# Patient Record
(demographics unavailable — no encounter records)

---

## 2019-04-12 NOTE — MORECARE
CASE MANAGEMENT DISCHARGE SUMMARY
 
 
PATIENT: ROCIO BLAIR                       UNIT: [id number]
ACCOUNT#: [id number]                       ADM DATE: 19
AGE: 72     : 02/15/47  SEX: M            ROOM/BED: D.2309    
AUTHOR: DARIANA PAUL                             PHYSICIAN:                               
 
REFERRING PHYSICIAN: BARBARA RIDLEY MD               
DATE OF SERVICE: 19
Discharge Plan
 
 
Patient Name: ROCIO BLAIR
[facility]
Encounter #: [id number]
Medical Record #: [id number]
: 1947
Planned Disposition: Home with Home Health and Infusion Serv
Anticipated Discharge Date: 
 
Discharge Date: 
Expected LOS: 
Initial Reviewer: [email or web address]
Initial Review Date: 2019
Generated: 19   7:01 pm 
  
 
 
 
 
 
 
 
Patient Name: ROCIO BLAIR
 
Encounter #: [id number]
Page [id number]
 
 
 
 
 
Electronically Signed by DARIANA PAUL on 19 at 1801
 
 
 
 
 
 
**All edits/amendments must be made on the electronic document**
 
DICTATION DATE: 19     : FRANK  19     
RPT#: [id number]                                DC DATE:        
                                               STATUS: ADM IN  
[facility]
[address] [place] [address]
***END OF REPORT***

## 2019-04-12 NOTE — MORECARE
CASE MANAGEMENT DISCHARGE SUMMARY
 
 
PATIENT: ROCIO BLAIR                       UNIT: [id number]
ACCOUNT#: [id number]                       ADM DATE: 19
AGE: 72     : 02/15/47  SEX: M            ROOM/BED: D.2309    
AUTHOR: HUMBERTO,DOC                             PHYSICIAN:                               
 
REFERRING PHYSICIAN: BARBARA RIDLEY MD               
DATE OF SERVICE: 19
Discharge Plan
 
 
Patient Name: ROCIO BLAIR
Facility: [facility]:[place]
Encounter #: [id number]
Medical Record #: [id number]
: 1947
Planned Disposition: Home with Home Health and Infusion Serv
Anticipated Discharge Date: 
 
Discharge Date: 
Expected LOS: 
Initial Reviewer: [email or web address]
Initial Review Date: 2019
Generated: 19   7:23 pm 
Comments
 
DCP- Discharge Planning
 
Updated by [email or web address]: Ashley Adams on 19   5:22 pm CT
LATE ENTRY  19 @ 1300  
  
Patient Name: ROCIO BLAIR                                     
Admission Status: ER   
Accout number: [id number]                              
Admission Date: 2019   
: 1947                                                        
Admission Diagnosis:MALIGNANT NEOPLASM OF GLOTTIS   
Attending: BARBARA RIDLEY                                                
Current LOS:  4   
  
Anticipated DC Date:    
Planned Disposition: Home with Home Health and Infusion Serv   
Primary Insurance: [employer] CHOICE PPO [facility]   
  
  
Discharge Planning Comments: CM met with patient at bedside he was able to 
mouth and nod his head to answer questions.  He states he plans on living 
with his sister upon discharge. Has no medical equipment or previous home 
 
health services.  Patient gave CM permission to call sister Madhuri for any 
information needed.  CM will continue to follow and assist as needed with 
discharge planning / needs.  
  
  
  
  
  
  
: Ashley Adams
 DCPIA - Discharge Planning Initial Assessment
 
Updated by [email or web address]: Ashley Adams on 19   6:05 pm
*  Is the patient Alert and Oriented?
Yes
*  PCP
NO PCP
*  Pharmacy
DENIES HAVING A PHARMACY
*  Preadmission Environment
Home Alone
*  ADLs
Independent
*  Equipment
None
*  List name and contact numbers for known caregivers / representatives who 
currently or will assist patient after discharge:
MADHURI AVITIA  - [phone]
*  Verbal permission to speak to the caregivers and representatives has been 
obtained from the patient.
Yes
*  Community resources currently utilized
None
*  Additional services required to return to the preadmission environment?
No
*  Can the patient safely return to the preadmission environment?
Yes
*  Has this patient been hospitalized within the prior 30 days at any 
hospital?
No
 
 
 
 
 
 
 
Last DP export: 19   5:08 p
Patient Name: ROCIO BLAIR
 
Encounter #: [id number]
Page [id number]
 
 
 
 
 
Electronically Signed by DARIANA PAUL on 19 at 1823
 
 
 
 
 
 
**All edits/amendments must be made on the electronic document**
 
DICTATION DATE: 19     : FRANK  19     
RPT#: [id number]                                DC DATE:        
                                               STATUS: ADM IN  
[facility]
[address] [place] [address]
***END OF REPORT***

## 2019-04-12 NOTE — MORECARE
CASE MANAGEMENT DISCHARGE SUMMARY
 
 
PATIENT: ROCIO BLAIR                       UNIT: [id number]
ACCOUNT#: [id number]                       ADM DATE: 19
AGE: 72     : 02/15/47  SEX: M            ROOM/BED: D.2309    
AUTHOR: DARIANA PAUL                             PHYSICIAN:                               
 
REFERRING PHYSICIAN: BARBARA RIDLEY MD               
DATE OF SERVICE: 19
Discharge Plan
 
 
Patient Name: ROCIO BLAIR
[facility]
Encounter #: [id number]
Medical Record #: [id number]
: 1947
Planned Disposition: Home with Home Health and Infusion Serv
Anticipated Discharge Date: 
 
Discharge Date: 
Expected LOS: 
Initial Reviewer: [id number]
Initial Review Date: 2019
Generated: 19   7:08 pm 
 DCPIA - Discharge Planning Initial Assessment
 
Updated by [email or web address]: Ashley Adams on 19   6:05 pm
*  Is the patient Alert and Oriented?
Yes
*  PCP
NO PCP
*  Pharmacy
DENIES HAVING A PHARMACY
*  Preadmission Environment
Home Alone
*  ADLs
Independent
*  Equipment
None
*  List name and contact numbers for known caregivers / representatives who 
currently or will assist patient after discharge:
LUCIE AVITIA [facility] [phone]
*  Verbal permission to speak to the caregivers and representatives has been 
obtained from the patient.
Yes
*  Community resources currently utilized
None
*  Additional services required to return to the preadmission environment?
 
No
*  Can the patient safely return to the preadmission environment?
Yes
*  Has this patient been hospitalized within the prior 30 days at any 
hospital?
No
 
 
 
 
 
 
 
Last DP export: 19   5:01 p
Patient Name: ROCIO BLAIR
Encounter #: [id number]
Page [id number]
 
 
 
 
 
Electronically Signed by DARIANA PAUL on 19 at 1809
 
 
 
 
 
 
**All edits/amendments must be made on the electronic document**
 
DICTATION DATE: 19     : FRANK  19     
RPT#: [id number]                                DC DATE:        
                                               STATUS: ADM IN  
[facility]
 [place] [address]
***END OF REPORT***

## 2019-04-12 NOTE — MORECARE
CASE MANAGEMENT DISCHARGE SUMMARY
 
 
PATIENT: ROCIO BLAIR                       UNIT: [id number]
ACCOUNT#: [id number]                       ADM DATE: 19
AGE: 72     : 02/15/47  SEX: M            ROOM/BED: D.2309    
AUTHOR: HUMBERTO,DOC                             PHYSICIAN:                               
 
REFERRING PHYSICIAN: BARBARA RIDLEY MD               
DATE OF SERVICE: 19
Discharge Plan
 
 
Patient Name: ROCIO BLAIR
Facility: [facility]:[place]
Encounter #: [id number]
Medical Record #: [id number]
: 1947
Planned Disposition: Home with Home Health and Infusion Serv
Anticipated Discharge Date: 
 
Discharge Date: 
Expected LOS: 
Initial Reviewer: [id number]
Initial Review Date: 2019
Generated: 19   7:35 pm 
Comments
 
DCP- Discharge Planning
 
Updated by [id number]: Ashley Adams on 19   5:34 pm CT
CM received call from Dr. Waite that patient is going to need laryngectomy. 
He states that the patient and sister will need teaching on trach care and 
tube feedings prior to discharge.  He states that he will most likely need 
home health.  He states that he wants when the patient is discharged to go 
see [place] in [place] for [place]. within the same week.  Planning on discharge 
early next week.  CM wasn't able to get in touch with patients sister for [facility] 
for Home Health services.  CM will continue to follow and assist as needed 
with discharge planning / needs.
DCP- Discharge Planning
 
Updated by [id number]: Ashley Adams on 19   5:22 pm CT
LATE ENTRY  19 @ 1300  
  
Patient Name: ROCIO BLAIR                                     
Admission Status: ER   
Accout number: [id number]                              
Admission Date: 2019   
: 1947                                                        
Admission Diagnosis:MALIGNANT NEOPLASM OF GLOTTIS   
 
Attending: BARBARA RIDLEY                                                
Current LOS:  4   
  
Anticipated DC Date:    
Planned Disposition: Home with Home Health and Infusion Serv   
Primary Insurance: [employer] CHOICE PPO [facility]   
  
  
Discharge Planning Comments: CM met with patient at bedside he was able to 
mouth and nod his head to answer questions.  He states he plans on living 
with his sister upon discharge. Has no medical equipment or previous home 
health services.  Patient gave CM permission to call sister Madhuri for any 
information needed.  CM will continue to follow and assist as needed with 
discharge planning / needs.  
  
  
  
  
  
  
: Ashley Adams
 DCPIA - Discharge Planning Initial Assessment
 
Updated by [id number]: Ashley Adams on 19   6:05 pm
*  Is the patient Alert and Oriented?
Yes
*  PCP
NO PCP
*  Pharmacy
DENIES HAVING A PHARMACY
*  Preadmission Environment
Home Alone
*  ADLs
Independent
*  Equipment
None
*  List name and contact numbers for known caregivers / representatives who 
currently or will assist patient after discharge:
MADHURI AVITIA - [facility] [phone]
*  Verbal permission to speak to the caregivers and representatives has been 
obtained from the patient.
Yes
*  Community resources currently utilized
None
*  Additional services required to return to the preadmission environment?
No
*  Can the patient safely return to the preadmission environment?
Yes
*  Has this patient been hospitalized within the prior 30 days at any 
hospital?
No
 
 
 
 
 
 
 
 
Last DP export: 19   5:23 p
Patient Name: ROCIO BLAIR
Encounter #: [id number]
Page [id number]
 
 
 
 
 
Electronically Signed by DARIANA PAUL on 19 at 1835
 
 
 
 
 
 
**All edits/amendments must be made on the electronic document**
 
DICTATION DATE: 19     : FRANK  19     
RPT#: [id number]                                DC DATE:        
                                               STATUS: ADM IN  
[facility]
[address] [place] [address]
***END OF REPORT***

## 2019-04-13 NOTE — MORECARE
CASE MANAGEMENT DISCHARGE SUMMARY
 
 
PATIENT: ROCIO BLAIR                       UNIT: [id number]
ACCOUNT#: [id number]                       ADM DATE: 19
AGE: 72     : 02/15/47  SEX: M            ROOM/BED: D.2216    
AUTHOR: HUMBERTO,DOC                             PHYSICIAN:                               
 
REFERRING PHYSICIAN: BARBARA RIDLEY MD               
DATE OF SERVICE: 19
Discharge Plan
 
 
Patient Name: ROCIO BLAIR
Facility: [facility]:[place]
Encounter #: [id number]
Medical Record #: [id number]
: 1947
Planned Disposition: Home with Home Health and Infusion Serv
Anticipated Discharge Date: 
 
Discharge Date: 
Expected LOS: 
Initial Reviewer: [email or web address]
Initial Review Date: 2019
Generated: 19   3:59 pm 
Comments
 
DCP- Discharge Planning
 
Updated by [id number]: Carly Newman on 19   1:58 pm CT
DR HERNANDEZ SPOKE W/ CM THIS EARLY AM.   
HE WILL BE REFERRING THE PATIENT TO DR JEROME [facility]ILLILAND [facility] LOC[facility] IN [place]. HE WILL PERSONALLY CALL DR ELLISON ON MONDAY TO 
EXPEDIATE THE REFERRAL.  
CM ADVISED THE PATIENT.  
CM CALLED [facility] AND REQUESTED THE  PATIENT'S FILMS ON DISC. 
RADIOLOGY WILL DELIVER THE DISC TO THE UNIT ON .  
PACKET W/ CLINICAL INFORMATION PREPARED.  
CM SPOKE W/ RESPIRATORY THERAPIST, THOMAS, REGARDING PATIENT TEACHING. HE BEGAN 
TEACHING W/ THE PATIENT THIS AM. HE WILL DOCUMENT SUBJECT AND PROGRESS.  
CAPRI SPOKE WITH THE PRIMARY NURSE, ELEANOR, TO UPDATE. DISCUSSED TEACHING NEEDS. 
SHE ALSO IS FAMILIAR W/ DR ELLISON AND WILL DISCUSS WITH THE PATIENT IS HE HAS 
ANY CONCERNS OR QUESTIONS.  
 STATED THE PATIENT MAYBE DISCHARGE TO HOME W/ ERLINDA.  
CM WILL FOLLOW UP WITH THE PATIENT AND HIS SISTER REGARDING HOME HEALTH 
SERVICES AND PROVIDERS.  
CM RECEIVED CM CONSULT. INITIAL ASSESSMENT WAS DONE 19.  
CM FOLLOWING FOR DCP NEEDS.
DCP- Discharge Planning
 
 
Updated by [id number]: Ashley Adams on 19   5:34 pm CT
CM received call from Dr. Hernandez that patient is going to need laryngectomy. 
He states that the patient and sister will need teaching on trach care and 
tube feedings prior to discharge.  He states that he will most likely need 
home health.  He states that he wants when the patient is discharged to go 
see [place] in [place] for [place]. within the same week.  Planning on discharge 
early next week.  CM wasn't able to get in touch with patients sister for [facility] 
for Home Health services.  CM will continue to follow and assist as needed 
with discharge planning / needs.
DCP- Discharge Planning
 
Updated by [id number]: Ashley Adams on 19   5:22 pm CT
LATE ENTRY  19 @ 1300  
  
Patient Name: ROCIO BLAIR                                     
Admission Status: ER   
Accout number: [id number]                              
Admission Date: 2019   
: 1947                                                        
Admission Diagnosis:MALIGNANT NEOPLASM OF GLOTTIS   
Attending: BARBARA RIDLEY                                                
Current LOS:  4   
  
Anticipated DC Date:    
Planned Disposition: Home with Home Health and Infusion Serv   
Primary Insurance: [employer] CHO[employer] PPO [facility]   
  
  
Discharge Planning Comments: CM met with patient at bedside he was able to 
mouth and nod his head to answer questions.  He states he plans on living 
with his sister upon discharge. Has no medical equipment or previous home 
health services.  Patient gave CM permission to call sister Madhuri for any 
information needed.  CM will continue to follow and assist as needed with 
discharge planning / needs.  
  
  
  
  
  
  
: Ashley Adams
 DCPIA - Discharge Planning Initial Assessment
 
Updated by [id number]: Ashley Adams on 19   6:05 pm
*  Is the patient Alert and Oriented?
Yes
*  PCP
NO PCP
*  Pharmacy
DENIES HAVING A PHARMACY
*  Preadmission Environment
 
Home Alone
*  ADLs
Independent
*  Equipment
None
*  List name and contact numbers for known caregivers / representatives who 
currently or will assist patient after discharge:
MADHURI AVITIA - - [phone]
*  Verbal permission to speak to the caregivers and representatives has been 
obtained from the patient.
Yes
*  Community resources currently utilized
None
*  Additional services required to return to the preadmission environment?
No
*  Can the patient safely return to the preadmission environment?
Yes
*  Has this patient been hospitalized within the prior 30 days at any 
hospital?
No
 
 
 
 
 
 
 
Last DP export: 19   5:35 p
Patient Name: ROCIO BLAIR
Encounter #: [id number]
Page [id number]
 
 
 
 
 
Electronically Signed by DARIANA PAUL on 19 at 5176
 
 
 
 
 
 
**All edits/amendments must be made on the electronic document**
 
DICTATION DATE: 19 7785     : FRANK  19 0374     
RPT#: [id number]                                DC DATE:        
                                               STATUS: ADM IN  
[facility]
 [place] [address]
***END OF REPORT***

## 2019-04-13 NOTE — CN
PATIENT NAME:ROCIO CHICAS                                 MEDICAL RECORD: [id number]
: 02/15/47                                              LOCATION:[facility]
ADMIT DATE: 19                                       ACCOUNT: [id number]
CONSULTING PHYSICIAN:    ADA HERNANDEZ MD                
                                               
REFERRING PHYSICIAN:     BARBARA RIDLEY MD               
 
 
DATE OF CONSULTATION:  2019
 
CONSULT FROM:  Emergency Room.
 
REASON FOR CONSULT:  For airway compromise.
 
HISTORY OF PRESENT ILLNESS:  Mr. Chicas is a 72-year-old male was sent from
[place] with hypertension and difficulty breathing.  Had a CT done at [place]
the time of the consult, all I knew was that there was a supraglottic mass, on
seeing the patient, he was obviously sitting upright, 100% nonrebreather, barely
maintaining sats.  He was able to communicate, not really able to understand his
voice very well, extreme hoarseness, biphasic stridor, labored breathing, talked
to him a little, was unable to get too much information but just that he had
progressive trouble with his breathing.  It was not really acute, talked to him
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
cancer and a long history of smoking.  We went over that with him, possibility
of awake tracheotomy versus intubating and then tracheotomy, was not sure at
that time.
 
PLAN:  OR emergently for tracheotomy, laryngoscopy, biopsy.
 
TRANSINT:[id number] Voice Confirmation ID: [id number] DOCUMENT ID: [id number]
                                           
                                           ADA HERNANDEZ MD                
 
 
 
Electronically Signed by ADA HERNANDEZ on 19 at 1049
 
 
 
 
 
 
 
 
 
 
 
 
 
 
CC:                                                             [id number]
DICTATION DATE: 19     :     19 2343      ADM IN  
                                                                              
[facility]                                          
[address] [place] [address]

## 2019-04-13 NOTE — OP
PATIENT NAME:  ROCIO BLAIR                             MEDICAL RECORD: [id number]
:02/15/47                                             LOCATION:[facility]
                                                         ADMISSION DATE:19
SURGEON:  GORDON HERNANDEZ MD                
 
 
DATE OF OPERATION:  2019
 
PREOPERATIVE DIAGNOSIS:  Stridor airway obstruction.
 
POSTOPERATIVE DIAGNOSIS:  Stridor airway obstruction.
 
PROCEDURE:  Emergent tracheotomy and laryngoscopy with biopsy.
 
SURGEON:  Gordon Hernandez MD
 
ANESTHESIA:  General orotracheal.
 
BLOOD LOSS:  1 cc.
 
SPECIMENS:  Multiple biopsies of the glottic area and really no cords.
 
TRACH TUBE:  A #6 cuffed Shiley.
 
COMPLICATIONS:  None.
 
DISPOSITION:  ICU.
 
FINDINGS:  Flexible laryngoscopy before he was given any sedation showed a
fairly normal supraglottic larynx.  The arytenoids looked normal.  AE folds,
piriforms looked normal.  Postcricoid area looked normal, but the area of the
cords was just a white gray tumor and there was a slit-like area where air was
moving through.  He was edentulous, had good neck mobility.  On exam of his
neck, there were no palpable masses.  His cricoid was right at the sternal
notch.  He was sedated with just propofol and with a #4 Gaona blade.  Easily
and quickly placed a 5-1/2 cuffed tube through the tumor mass on to the airway
and the cuff was inflated that was used to ventilate him and then he was prepped
and draped in usual sterile fashion for tracheotomy.  Skin was injected with 1
cc of 1% lidocaine with 1:100,000 epinephrine and a horizontal incision was made
residential just right at the level of the cricoid, really it was right above the
sternal notch.  This was taken down.  The cricoid was palpated.  The strap
muscles were divided in the midline.  Upper portion of the thyroid was divided
with a Spatula tip cautery.  The trachea was identified.  I tried to perform a
fairly high tracheotomy to preserve as much of the trachea for laryngectomy as
possible, but entered tumor just below the cricoid, so went a little bit further
down, second tracheal ring, took that out after decrease in the end tidal, the
FiO2, entered the trachea with a 15 blade, removed that portion of the anterior
tracheal ring with a tonsil clamp and then backed up the ET tube slightly and
placed a #6 cuffed Shiley, inflated the cuff, hooked him up to the ventilator,
couple of 2-0 Prolene sutures in the skin to make the tracheotomy wound smaller
and then sutured the trach tube to the skin with a flange and 0 Prolene and then
placed the trach ties tied around the neck, the cotton trach tie.  Then turned
the table 90 degrees.  Kleinsasser J laryngoscope was inserted, again viewed the
larynx.  Supraglottic larynx again fairly normal.  The entire glottic level of
the glottis was just a gray tumor mass.  No discernible cords or anatomy below
that down into the subglottic area, took about 10 biopsies to grade little bit
of an open airway  there as well with upbiting 4 mm cup forceps that was all
sent in formalin for path.  There was really no bleeding there.  He was
 
 
 
OPERATIVE REPORT                               [id number]    ROCIO BLAIR           
 
 
transported to the ICU on the ventilator.
 
TRANSINT:[id number] Voice Confirmation ID: [id number] DOCUMENT ID: [id number]
                                           
                                           GORDON HERNANDEZ MD                
 
 
 
Electronically Signed by GORDON HERNANDEZ on 19 at 1049
 
 
 
 
 
 
 
 
 
 
 
 
 
 
 
 
 
 
 
 
 
 
 
 
 
 
 
 
 
 
 
 
 
 
 
 
 
 
CC:                                                             [id number]
DICTATION DATE: 19     :     19 0297      ADM IN  
                                                                              
[facility]                                          
[address] [id number]

## 2019-04-15 NOTE — MORECARE
CASE MANAGEMENT DISCHARGE SUMMARY
 
 
PATIENT: ROCIO BLAIR                       UNIT: [id number]
ACCOUNT#: [id number]                       ADM DATE: 19
AGE: 72     : 02/15/47  SEX: M            ROOM/BED: D.2216    
AUTHOR: HUMBERTO,DOC                             PHYSICIAN:                               
 
REFERRING PHYSICIAN: BARBARA RIDLEY MD               
DATE OF SERVICE: 04/15/19
Discharge Plan
 
 
Patient Name: ROCIO BLAIR
Facility: [facility]
Encounter #: [id number]
Medical Record #: [id number]
: 1947
Planned Disposition: Home with Home Health and Infusion Serv
Anticipated Discharge Date: 
 
Discharge Date: 
Expected LOS: 
Initial Reviewer: [id number]
Initial Review Date: 2019
Generated: 4/15/19   3:58 pm 
DCP- Discharge Planning
 
Updated by [id number]: Carly Newman on 19   1:58 pm CT
DR HERNANDEZ SPOKE W/ CM THIS EARLY AM.   
HE WILL BE REFERRING THE PATIENT TO DR JEROME GILLILAND AN ONCOLOGY HEAD AND NECK 
SURGEON LOCATED IN [place]. HE WILL PERSONALLY CALL DR ELLISON ON MONDAY TO 
EXPEDIATE THE REFERRAL.  
CM ADVISED THE PATIENT.  
CM CALLED [facility] AND REQUESTED THE  PATIENT'S FILMS ON DISC. 
RADIOLOGY WILL DELIVER THE DISC TO THE UNIT ON .  
PACKET W/ CLINICAL INFORMATION PREPARED.  
CM SPOKE W/ RESPIRATORY THERAPIST, THOMAS, REGARDING PATIENT TEACHING. HE BEGAN 
TEACHING W/ THE PATIENT THIS AM. HE WILL DOCUMENT SUBJECT AND PROGRESS.  
CAPRI SPOKE WITH THE PRIMARY NURSE, ELEANOR, TO UPDATE. DISCUSSED TEACHING NEEDS. 
SHE ALSO IS FAMILIAR W/ DR ELLISON AND WILL DISCUSS WITH THE PATIENT IS HE HAS 
ANY CONCERNS OR QUESTIONS.  
 STATED THE PATIENT MAYBE DISCHARGE TO HOME W/ COFFEY.  
CM WILL FOLLOW UP WITH THE PATIENT AND HIS SISTER REGARDING HOME HEALTH 
SERVICES AND PROVIDERS.  
CM RECEIVED CM CONSULT. INITIAL ASSESSMENT WAS DONE 19.  
CM FOLLOWING FOR DCP NEEDS.
DCP- Discharge Planning
 
Updated by [id number]: Ashley Adams on 19   5:34 pm CT
 
CM received call from Dr. Hernandez that patient is going to need laryngectomy. 
He states that the patient and sister will need teaching on trach care and 
tube feedings prior to discharge.  He states that he will most likely need 
home health.  He states that he wants when the patient is discharged to go 
see MD in [place] for l[place]. within the same week.  Planning on discharge 
early next week.  CM wasn't able to get in touch with patients sister for [facility] 
for Home Health services.  CM will continue to follow and assist as needed 
with discharge planning / needs.
DCP- Discharge Planning
 
Updated by [id number]: Ashley Adams on 19   5:22 pm CT
LATE ENTRY  19 @ 1300  
  
Patient Name: ROCIO BLAIR                                     
Admission Status: ER   
Accout number: [id number]                              
Admission Date: 2019   
: 1947                                                        
Admission Diagnosis:MALIGNANT NEOPLASM OF GLOTTIS   
Attending: BARBARA RIDLEY                                                
Current LOS:  4   
  
Anticipated DC Date:    
Planned Disposition: Home with Home Health and Infusion Serv   
Primary Insurance: [employer] CHOICE PPO [facility]   
  
  
Discharge Planning Comments: CM met with patient at bedside he was able to 
mouth and nod his head to answer questions.  He states he plans on living 
with his sister upon discharge. Has no medical equipment or previous home 
health services.  Patient gave CM permission to call sister Madhuri for any 
information needed.  CM will continue to follow and assist as needed with 
discharge planning / needs.  
  
  
  
  
  
  
: Ashley Adams
 DCPIA - Discharge Planning Initial Assessment
 
Updated by [email or web address]: Ashley Adams on 19   6:05 pm
*  Is the patient Alert and Oriented?
Yes
*  PCP
NO PCP
*  Pharmacy
DENIES HAVING A PHARMACY
*  Preadmission Environment
Home Alone
*  ADLs
 
Independent
*  Equipment
None
*  List name and contact numbers for known caregivers / representatives who 
currently or will assist patient after discharge:
MADHURI AVITIA - - [phone]
*  Verbal permission to speak to the caregivers and representatives has been 
obtained from the patient.
Yes
*  Community resources currently utilized
None
*  Additional services required to return to the preadmission environment?
No
*  Can the patient safely return to the preadmission environment?
Yes
*  Has this patient been hospitalized within the prior 30 days at any 
hospital?
No
 
External Providers
External Provider: [facility]
 
Next Contact Date: 
Service Request Date: 
Service Type: 
Resolution: 
 
Reviewer: 
Comments: 
 
 
 
 
 
 
Last DP export: 19   1:59 p
Patient Name: ROCIO BLAIR
 
Encounter #: [id number]
Page [id number]
 
 
 
 
 
Electronically Signed by DARIANA PAUL on 04/15/19 at 1458
 
 
 
 
 
 
**All edits/amendments must be made on the electronic document**
 
DICTATION DATE: 04/15/19 1458     : FRANK  04/15/19 1458     
RPT#: [id number]                                [place] DATE:        
                                               STATUS: ADM IN  
[facility]
 [place] [address]
***END OF REPORT***

## 2019-04-15 NOTE — MORECARE
CASE MANAGEMENT DISCHARGE SUMMARY
 
 
PATIENT: ROCIO BLAIR                       UNIT: [id number]
ACCOUNT#: [id number]                       ADM DATE: 19
AGE: 72     : 02/15/47  SEX: M            ROOM/BED: D.2216    
AUTHOR: HUMBERTO,DOC                             PHYSICIAN:                               
 
REFERRING PHYSICIAN: BARBARA RIDLEY MD               
DATE OF SERVICE: 04/15/19
Discharge Plan
 
 
Patient Name: ROCIO BLAIR
Facility: [facility]
Encounter #: [id number]
Medical Record #: [id number]
: 1947
Planned Disposition: Home with Home Health and Infusion Serv
Anticipated Discharge Date: 
 
Discharge Date: 
Expected LOS: 
Initial Reviewer: [id number]
Initial Review Date: 2019
Generated: 4/15/19   5:41 pm 
DCP- Discharge Planning
 
Updated by [id number]: Carly Newman on 19   1:58 pm CT
DR HERNANDEZ SPOKE W/ CM THIS EARLY AM.   
HE WILL BE REFERRING THE PATIENT TO DR JEROME GILLILAND AN ONCOLOGY HEAD AND NECK 
SURGEON LOCATED IN [place]. HE WILL PERSONALLY CALL DR ELLISON ON MONDAY TO 
EXPEDIATE THE REFERRAL.  
CM ADVISED THE PATIENT.  
CM CALLED MEDICAL IMAGING AND REQUESTED THE  PATIENT'S FILMS ON DISC. 
RADIOLOGY WILL DELIVER THE DISC TO THE UNIT ON .  
PACKET W/ CLINICAL INFORMATION PREPARED.  
CM SPOKE W/ RESPIRATORY THERAPIST, THOMAS, REGARDING PATIENT TEACHING. HE BEGAN 
TEACHING W/ THE PATIENT THIS AM. HE WILL DOCUMENT SUBJECT AND PROGRESS.  
CAPRI SPOKE WITH THE PRIMARY NURSE, ELEANOR, TO UPDATE. DISCUSSED TEACHING NEEDS. 
SHE ALSO IS FAMILIAR W/ DR ELLISON AND WILL DISCUSS WITH THE PATIENT IS HE HAS 
ANY CONCERNS OR QUESTIONS.  
 STATED THE PATIENT MAYBE DISCHARGE TO HOME W/ COFFEY.  
CM WILL FOLLOW UP WITH THE PATIENT AND HIS SISTER REGARDING HOME HEALTH 
SERVICES AND PROVIDERS.  
CM RECEIVED CM CONSULT. INITIAL ASSESSMENT WAS DONE 19.  
CM FOLLOWING FOR DCP NEEDS.
DCP- Discharge Planning
 
Updated by [id number]: Ashley Adams on 19   5:34 pm CT
 
CM received call from Dr. Hernandez that patient is going to need laryngectomy. 
He states that the patient and sister will need teaching on trach care and 
tube feedings prior to discharge.  He states that he will most likely need 
home health.  He states that he wants when the patient is discharged to go 
see MD in [place] for l[place]. within the same week.  Planning on discharge 
early next week.  CM wasn't able to get in touch with patients sister for [facility] 
for Home Health services.  CM will continue to follow and assist as needed 
with discharge planning / needs.
DCP- Discharge Planning
 
Updated by [id number]: Ashley Adams on 19   5:22 pm CT
LATE ENTRY  19 @ 1300  
  
Patient Name: ROCIO BLAIR                                     
Admission Status: ER   
Accout number: [id number]                              
Admission Date: 2019   
: 1947                                                        
Admission Diagnosis:MALIGNANT NEOPLASM OF GLOTTIS   
Attending: BARBARA RIDLEY                                                
Current LOS:  4   
  
Anticipated DC Date:    
Planned Disposition: Home with Home Health and Infusion Serv   
Primary Insurance: [employer] CHO[employer] PPO [facility]   
  
  
Discharge Planning Comments: CM met with patient at bedside he was able to 
mouth and nod his head to answer questions.  He states he plans on living 
with his sister upon discharge. Has no medical equipment or previous home 
health services.  Patient gave CM permission to call sister Madhuri for any 
information needed.  CM will continue to follow and assist as needed with 
discharge planning / needs.  
  
  
  
  
  
  
: Ashley Adams
 DCPIA - Discharge Planning Initial Assessment
 
Updated by [email or web address]: Ashley Adams on 19   6:05 pm
*  Is the patient Alert and Oriented?
Yes
*  PCP
NO PCP
*  Pharmacy
DENIES HAVING A PHARMACY
*  Preadmission Environment
Home Alone
*  ADLs
 
Independent
*  Equipment
None
*  List name and contact numbers for known caregivers / representatives who 
currently or will assist patient after discharge:
MADHURI AVITIA - - [phone]
*  Verbal permission to speak to the caregivers and representatives has been 
obtained from the patient.
Yes
*  Community resources currently utilized
None
*  Additional services required to return to the preadmission environment?
No
*  Can the patient safely return to the preadmission environment?
Yes
*  Has this patient been hospitalized within the prior 30 days at any 
hospital?
No
 
 
 
 
 
 
 
Last DP export: 4/15/19   1:58 p
Patient Name: ROCIO BLAIR
Encounter #: [id number]
Page [id number]
 
 
 
 
 
Electronically Signed by DARIANA PAUL on 04/15/19 at 1642
 
 
 
 
 
 
**All edits/amendments must be made on the electronic document**
 
DICTATION DATE: 04/15/19 1641     : FRANK  04/15/19 1641     
RPT#: [id number]                                DC DATE:        
                                               STATUS: ADM IN  
[facility]
 [facility] [place] [address]
***END OF REPORT***

## 2019-04-15 NOTE — MORECARE
CASE MANAGEMENT DISCHARGE SUMMARY
 
 
PATIENT: ROCIO BLAIR                       UNIT: [id number]
ACCOUNT#: [id number]                       ADM DATE: 19
AGE: 72     : 02/15/47  SEX: M            ROOM/BED: D.2216    
AUTHOR: HUMBERTO,DOC                             PHYSICIAN:                               
 
REFERRING PHYSICIAN: BARBARA RIDLEY MD               
DATE OF SERVICE: 04/15/19
Discharge Plan
 
 
Patient Name: ROCIO BLAIR
Facility: [facility]
Encounter #: [id number]
Medical Record #: [id number]
: 1947
Planned Disposition: Home with Home Health and Infusion Serv
Anticipated Discharge Date: 
 
Discharge Date: 
Expected LOS: 
Initial Reviewer: [id number]
Initial Review Date: 2019
Generated: 4/15/19   5:50 pm 
Comments
 
DCP- Discharge Planning
 
Updated by [id number]: Steph Charlton on 4/15/19   3:46 pm CT
Spoke with Dr Hernandez this AM, he states he is ready for discharge if we can 
get home health set up. I called and spoke with patient and his sister (who 
he will be discharging home to) sister address is [address] [place] [address] in [facility]. 
 [facility] with [facility] , They will be able to accept the patient.  Walk test 
ordered and he will not need O2 when he is discharged.   He has a peg tube, 
but since he is eating insurance will not cover his peg nutrition.  CM will 
have trach equipment set up for patient prior to discharge.  IMM served and 
explained. CM to follow and assist with DC plans
DCP- Discharge Planning
 
Updated by [email or web address]: Carly Newman on 19   1:58 pm CT
DR HERNANDEZ SPOKE W/ CM THIS EARLY AM.   
HE WILL BE REFERRING THE PATIENT TO DR JEROME GILLILAND AN ONCOLOGY HEAD AND NECK 
SURGEON LOCATED IN [place]. HE WILL PERSONALLY CALL DR ELLISON ON MONDAY TO 
EXPEDIATE THE REFERRAL.  
CM ADVISED THE PATIENT.  
CM CALLED MEDICAL IMAGING AND REQUESTED THE  PATIENT'S FILMS ON DISC. 
RADIOLOGY WILL DELIVER THE DISC TO THE UNIT ON .  
PACKET W/ CLINICAL INFORMATION PREPARED.  
 
CAPRI SPOKE W/ RESPIRATORY THERAPIST, THOMAS, REGARDING PATIENT TEACHING. HE BEGAN 
TEACHING W/ THE PATIENT THIS AM. HE WILL DOCUMENT SUBJECT AND PROGRESS.  
CAPRI SPOKE WITH THE PRIMARY NURSE, ELEANOR, TO UPDATE. DISCUSSED TEACHING NEEDS. 
SHE ALSO IS FAMILIAR W/ DR ELLISON AND WILL DISCUSS WITH THE PATIENT IS HE HAS 
ANY CONCERNS OR QUESTIONS.  
 STATED THE PATIENT MAYBE DISCHARGE TO HOME W/ COFFEY.  
CM WILL FOLLOW UP WITH THE PATIENT AND HIS SISTER REGARDING HOME HEALTH 
SERVICES AND PROVIDERS.  
CM RECEIVED CM CONSULT. INITIAL ASSESSMENT WAS DONE 19.  
CM FOLLOWING FOR DCP NEEDS.
DCP- Discharge Planning
 
Updated by [email or web address]: Ashley Adams on 19   5:34 pm CT
CM received call from Dr. Hernandez that patient is going to need laryngectomy. 
He states that the patient and sister will need teaching on trach care and 
tube feedings prior to discharge.  He states that he will most likely need 
home health.  He states that he wants when the patient is discharged to go 
see MD in [place] for [place]. within the same week.  Planning on discharge 
early next week.  CM wasn't able to get in touch with patients sister for [facility] 
for Home Health services.  CM will continue to follow and assist as needed 
with discharge planning / needs.
DCP- Discharge Planning
 
Updated by [email or web address]: Ashley Adams on 19   5:22 pm CT
LATE ENTRY  19 @ 1300  
  
Patient Name: ROCIO BLAIR                                     
Admission Status: ER   
Accout number: [id number]                              
Admission Date: 2019   
: 1947                                                        
Admission Diagnosis:MALIGNANT NEOPLASM OF GLOTTIS   
Attending: BARBARA RIDLEY                                                
Current LOS:  4   
  
Anticipated DC Date:    
Planned Disposition: Home with Home Health and Infusion Serv   
Primary Insurance: [employer] CHOICE PPO [facility]   
  
  
Discharge Planning Comments: CM met with patient at bedside he was able to 
mouth and nod his head to answer questions.  He states he plans on living 
with his sister upon discharge. Has no medical equipment or previous home 
health services.  Patient gave CM permission to call sister Madhuri for any 
information needed.  CM will continue to follow and assist as needed with 
discharge planning / needs.  
  
  
  
  
  
  
 
: Ashley Adams
 DCPIA - Discharge Planning Initial Assessment
 
Updated by [id number]: Ashley Adams on 19   6:05 pm
*  Is the patient Alert and Oriented?
Yes
*  PCP
NO PCP
*  Pharmacy
DENIES HAVING A PHARMACY
*  Preadmission Environment
Home Alone
*  ADLs
Independent
*  Equipment
None
*  List name and contact numbers for known caregivers / representatives who 
currently or will assist patient after discharge:
MADHURI AVITIA [facility] [phone]
*  Verbal permission to speak to the caregivers and representatives has been 
obtained from the patient.
Yes
*  Community resources currently utilized
None
*  Additional services required to return to the preadmission environment?
No
*  Can the patient safely return to the preadmission environment?
Yes
*  Has this patient been hospitalized within the prior 30 days at any 
hospital?
No
 
 
 
 
 
Coverage Notice
 
Reviewer: [email or web address] Eliecer Charlton
 
Notice Issued Date-Time: 04/15/2019  14:00
Notice Type: IM Discharge Notice
 
Notice Delivered To: Patient
Relationship to Patient: 
Representative Name: 
 
Delivery Method: HAND - Hand Delivered
Maria C Days:
Prior Verbal Notification: 
 
Recipient Understood Notice: Yes
 
Recipient Signature: Yes
Med Rec Note Co-signed by Attending:
 
Coverage Notice Comment:  
 
Last DP export: 4/15/19   3:42 p
Patient Name: ROCIO BLAIR
Encounter #: [id number]
Page [id number]
 
 
 
 
 
Electronically Signed by DARIANA PAUL on 04/15/19 at 1650
 
 
 
 
 
 
**All edits/amendments must be made on the electronic document**
 
DICTATION DATE: 04/15/19 1650     : FRANK  04/15/19 1650     
RPT#: [id number]                                DC DATE:        
                                               STATUS: ADM IN  
[facility]
[address] [place] [address]
***END OF REPORT***

## 2019-04-16 NOTE — MORECARE
CASE MANAGEMENT DISCHARGE SUMMARY
 
 
PATIENT: ROCIO BLAIR                       UNIT: [id number]
ACCOUNT#: [id number]                       ADM DATE: 19
AGE: 72     : 02/15/47  SEX: M            ROOM/BED: D.2216    
AUTHOR: HUMBERTO,DOC                             PHYSICIAN:                               
 
REFERRING PHYSICIAN: BARBARA RIDLEY MD               
DATE OF SERVICE: 19
Discharge Plan
 
 
Patient Name: ROCIO BLAIR
Facility: [facility]
Encounter #: [id number]
Medical Record #: [id number]
: 1947
Planned Disposition: Home with Home Health and Infusion Serv
Anticipated Discharge Date: 
 
Discharge Date: 
Expected LOS: 
Initial Reviewer: [id number]
Initial Review Date: 2019
Generated: 19   1:40 pm 
Comments
 
DCP- Discharge Planning
 
Updated by [id number]: Steph Charlton on 4/15/19   3:46 pm CT
Spoke with Dr Hernandez this AM, he states he is ready for discharge if we can 
get home health set up. I called and spoke with patient and his sister (who 
he will be discharging home to) sister address is [address] [place] [address] in [facility]. 
 [facility] with [facility] , They will be able to accept the patient.  Walk test 
ordered and he will not need O2 when he is discharged.   He has a peg tube, 
but since he is eating insurance will not cover his peg nutrition.  CM will 
have trach equipment set up for patient prior to discharge.  IMM served and 
explained. CM to follow and assist with DC plans
DCP- Discharge Planning
 
Updated by [id number]: Carly Newman on 19   1:58 pm CT
DR HERNANDEZ SPOKE W/ CM THIS EARLY AM.   
HE WILL BE REFERRING THE PATIENT TO DR JEROME GILLILAND AN ONCOLOGY HEAD AND NECK 
SURGEON LOCATED IN [place]. HE WILL PERSONALLY CALL DR ELLISON ON MONDAY TO 
EXPEDIATE THE REFERRAL.  
CM ADVISED THE PATIENT.  
CM CALLED MEDICAL IMAGING AND REQUESTED THE  PATIENT'S FILMS ON DISC. 
RADIOLOGY WILL DELIVER THE DISC TO THE UNIT ON .  
PACKET W/ CLINICAL INFORMATION PREPARED.  
 
CAPRI SPOKE W/ RESPIRATORY THERAPIST, THOMAS, REGARDING PATIENT TEACHING. HE BEGAN 
TEACHING W/ THE PATIENT THIS AM. HE WILL DOCUMENT SUBJECT AND PROGRESS.  
CAPRI SPOKE WITH THE PRIMARY NURSE, ELEANOR, TO UPDATE. DISCUSSED TEACHING NEEDS. 
SHE ALSO IS FAMILIAR W/ DR ELLISON AND WILL DISCUSS WITH THE PATIENT IS HE HAS 
ANY CONCERNS OR QUESTIONS.  
 STATED THE PATIENT MAYBE DISCHARGE TO HOME W/ COFFEY.  
CM WILL FOLLOW UP WITH THE PATIENT AND HIS SISTER REGARDING HOME HEALTH 
SERVICES AND PROVIDERS.  
CM RECEIVED CM CONSULT. INITIAL ASSESSMENT WAS DONE 19.  
CM FOLLOWING FOR DCP NEEDS.
DCP- Discharge Planning
 
Updated by [id number]: Ashley Adams on 19   5:34 pm CT
CM received call from Dr. Hernandez that patient is going to need laryngectomy. 
He states that the patient and sister will need teaching on trach care and 
tube feedings prior to discharge.  He states that he will most likely need 
home health.  He states that he wants when the patient is discharged to go 
see MD in [place] for l[place]. within the same week.  Planning on discharge 
early next week.  CM wasn't able to get in touch with patients sister for [place] 
for Home Health services.  CM will continue to follow and assist as needed 
with discharge planning / needs.
DCP- Discharge Planning
 
Updated by [email or web address]: Ashley Adams on 19   5:22 pm CT
LATE ENTRY  19 @ 1300  
  
Patient Name: ROCIO BLAIR                                     
Admission Status: ER   
Accout number: [id number]                              
Admission Date: 2019   
: 1947                                                        
Admission Diagnosis:MALIGNANT NEOPLASM OF GLOTTIS   
Attending: BARBARA RIDLEY                                                
Current LOS:  4   
  
Anticipated DC Date:    
Planned Disposition: Home with Home Health and Infusion Serv   
Primary Insurance: [employer] CHO[employer] PPO [facility]   
  
  
Discharge Planning Comments: CM met with patient at bedside he was able to 
mouth and nod his head to answer questions.  He states he plans on living 
with his sister upon discharge. Has no medical equipment or previous home 
health services.  Patient gave CM permission to call sister Madhuri for any 
information needed.  CM will continue to follow and assist as needed with 
discharge planning / needs.  
  
  
  
  
  
  
 
: Ashley Adams
 DCPIA - Discharge Planning Initial Assessment
 
Updated by [email or web address]: Ashley Adams on 19   6:05 pm
*  Is the patient Alert and Oriented?
Yes
*  PCP
NO PCP
*  Pharmacy
DENIES HAVING A PHARMACY
*  Preadmission Environment
Home Alone
*  ADLs
Independent
*  Equipment
None
*  List name and contact numbers for known caregivers / representatives who 
currently or will assist patient after discharge:
MADHURI AVITIA [facility] [phone]
*  Verbal permission to speak to the caregivers and representatives has been 
obtained from the patient.
Yes
*  Community resources currently utilized
None
*  Additional services required to return to the preadmission environment?
No
*  Can the patient safely return to the preadmission environment?
Yes
*  Has this patient been hospitalized within the prior 30 days at any 
hospital?
No
 
External Providers
External Provider: [facility]
 
Next Contact Date: 
Service Request Date: 
Service Type: 
Resolution: 
 
Reviewer: 
Comments: 
 
 
 
 
Coverage Notice
 
Reviewer: [email or web address] Eliecer Charlton
 
Notice Issued Date-Time: 04/15/2019  14:00
Notice Type: IM Discharge Notice
 
 
Notice Delivered To: Patient
Relationship to Patient: 
Representative Name: 
 
Delivery Method: HAND - Hand Delivered
Maria C Days:
Prior Verbal Notification: 
 
Recipient Understood Notice: Yes
Recipient Signature: Yes
Med Rec Note Co-signed by Attending:
 
Coverage Notice Comment:  
 
Last DP export: 19  11:26 a
Patient Name: ROCIO BLAIR
Encounter #: [id number]
Page 22600
 
 
 
 
 
Electronically Signed by DARIANA PAUL on 19 at 1240
 
 
 
 
 
 
**All edits/amendments must be made on the electronic document**
 
DICTATION DATE: 19 1240     : FRANK  19 1240     
RPT#: [id number]                                DC DATE:        
                                               STATUS: ADM IN  
[facility]
[address] [place] [address]
***END OF REPORT***

## 2019-04-16 NOTE — MORECARE
CASE MANAGEMENT DISCHARGE SUMMARY
 
 
PATIENT: ROCIO BLAIR                       UNIT: [id number]
ACCOUNT#: [id number]                       ADM DATE: 19
AGE: 72     : 02/15/47  SEX: M            ROOM/BED: D.2216    
AUTHOR: HUMBERTO,DOC                             PHYSICIAN:                               
 
REFERRING PHYSICIAN: BARBARA RIDLEY MD               
DATE OF SERVICE: 19
Discharge Plan
 
 
Patient Name: ROCIO BLAIR
Facility: [facility]:[place]
Encounter #: [id number]
Medical Record #: [id number]
: 1947
Planned Disposition: Home with Home Health and Infusion Serv
Anticipated Discharge Date: 
 
Discharge Date: 
Expected LOS: 
Initial Reviewer: [email or web address]
Initial Review Date: 2019
Generated: 19   1:26 pm 
Comments
 
DCP- Discharge Planning
 
Updated by [id number]: Steph Charlton on 4/15/19   3:46 pm CT
Spoke with Dr Hernandez this AM, he states he is ready for discharge if we can 
get home health set up. I called and spoke with patient and his sister (who 
he will be discharging home to) sister address is [address] [place] [address] in [facility]. 
 [facility] with [facility] , They will be able to accept the patient.  Walk test 
ordered and he will not need O2 when he is discharged.   He has a peg tube, 
but since he is eating insurance will not cover his peg nutrition.  CM will 
have trach equipment set up for patient prior to discharge.  IMM served and 
explained. CM to follow and assist with DC plans
DCP- Discharge Planning
 
Updated by [id number]: Carly Newman on 19   1:58 pm CT
DR HERNANDEZ SPOKE W/ CM THIS EARLY AM.   
HE WILL BE REFERRING THE PATIENT TO DR JEROME GILLILAND AN ONCOLOGY HEAD AND NECK 
SURGEON LOCATED IN [place]. HE WILL PERSONALLY CALL DR ELLISON ON MONDAY TO 
EXPEDIATE THE REFERRAL.  
CM ADVISED THE PATIENT.  
CM CALLED [facility] AND REQUESTED THE  PATIENT'S FILMS ON DISC. 
RADIOLOGY WILL DELIVER THE DISC TO THE UNIT ON .  
PACKET W/ CLINICAL INFORMATION PREPARED.  
 
CAPRI SPOKE W/ RESPIRATORY THERAPIST, THOMAS, REGARDING PATIENT TEACHING. HE BEGAN 
TEACHING W/ THE PATIENT THIS AM. HE WILL DOCUMENT SUBJECT AND PROGRESS.  
CAPRI SPOKE WITH THE PRIMARY NURSE, ELEANOR, TO UPDATE. DISCUSSED TEACHING NEEDS. 
SHE ALSO IS FAMILIAR W/ DR ELLISON AND WILL DISCUSS WITH THE PATIENT IS HE HAS 
ANY CONCERNS OR QUESTIONS.  
 STATED THE PATIENT MAYBE DISCHARGE TO HOME W/ COFFEY.  
CM WILL FOLLOW UP WITH THE PATIENT AND HIS SISTER REGARDING HOME HEALTH 
SERVICES AND PROVIDERS.  
CM RECEIVED CM CONSULT. INITIAL ASSESSMENT WAS DONE 19.  
CM FOLLOWING FOR DCP NEEDS.
DCP- Discharge Planning
 
Updated by [id number]: Ashley Adams on 19   5:34 pm CT
CM received call from Dr. Hernandez that patient is going to need laryngectomy. 
He states that the patient and sister will need teaching on trach care and 
tube feedings prior to discharge.  He states that he will most likely need 
home health.  He states that he wants when the patient is discharged to go 
see MD in [place] for l[place]. within the same week.  Planning on discharge 
early next week.  CM wasn't able to get in touch with patients sister for [place] 
for Home Health services.  CM will continue to follow and assist as needed 
with discharge planning / needs.
DCP- Discharge Planning
 
Updated by [id number]: Ashley Adams on 19   5:22 pm CT
LATE ENTRY  19 @ 1300  
  
Patient Name: ROCIO BLAIR                                     
Admission Status: ER   
Accout number: [id number]                              
Admission Date: 2019   
: 1947                                                        
Admission Diagnosis:MALIGNANT NEOPLASM OF GLOTTIS   
Attending: BARBARA RIDLEY                                                
Current LOS:  4   
  
Anticipated DC Date:    
Planned Disposition: Home with Home Health and Infusion Serv   
Primary Insurance: [employer] CHOICE PPO [facility]   
  
  
Discharge Planning Comments: CM met with patient at bedside he was able to 
mouth and nod his head to answer questions.  He states he plans on living 
with his sister upon discharge. Has no medical equipment or previous home 
health services.  Patient gave CM permission to call sister Madhuri for any 
information needed.  CM will continue to follow and assist as needed with 
discharge planning / needs.  
  
  
  
  
  
  
 
: Ashley Adams
 DCPIA - Discharge Planning Initial Assessment
 
Updated by [email or web address]: Ashley Adams on 19   6:05 pm
*  Is the patient Alert and Oriented?
Yes
*  PCP
NO PCP
*  Pharmacy
DENIES HAVING A PHARMACY
*  Preadmission Environment
Home Alone
*  ADLs
Independent
*  Equipment
None
*  List name and contact numbers for known caregivers / representatives who 
currently or will assist patient after discharge:
MADHURI AVITIA [facility] [phone]
*  Verbal permission to speak to the caregivers and representatives has been 
obtained from the patient.
Yes
*  Community resources currently utilized
None
*  Additional services required to return to the preadmission environment?
No
*  Can the patient safely return to the preadmission environment?
Yes
*  Has this patient been hospitalized within the prior 30 days at any 
hospital?
No
 
External Providers
External Provider: [facility]
 
Next Contact Date: 
Service Request Date: 
Service Type: 
Resolution: 
 
Reviewer: 
Comments: 
 
 
 
 
Coverage Notice
 
Reviewer: [email or web address] Eliecer Charlton
 
Notice Issued Date-Time: 04/15/2019  14:00
Notice Type: IM Discharge Notice
 
 
Notice Delivered To: Patient
Relationship to Patient: 
Representative Name: 
 
Delivery Method: HAND - Hand Delivered
Maria C Days:
Prior Verbal Notification: 
 
Recipient Understood Notice: Yes
Recipient Signature: Yes
Med Rec Note Co-signed by Attending:
 
Coverage Notice Comment:  
 
Last DP export: 4/15/19   3:50 p
Patient Name: ROCIO BLAIR
Encounter #: [id number]
Page [id number]
 
 
 
 
 
Electronically Signed by DARIANA PAUL on 19 at 1226
 
 
 
 
 
 
**All edits/amendments must be made on the electronic document**
 
DICTATION DATE: 19 1226     : FRANK  19 1226     
RPT#: [id number]                                DC DATE:        
                                               STATUS: ADM IN  
[facility]
[address] [place] [address]
***END OF REPORT***

## 2019-04-16 NOTE — MORECARE
CASE MANAGEMENT DISCHARGE SUMMARY
 
 
PATIENT: ROCIO BLAIR                       UNIT: [id number]
ACCOUNT#: [id number]                       ADM DATE: 19
AGE: 72     : 02/15/47  SEX: M            ROOM/BED: D.2216    
AUTHOR: HUMBERTO,DOC                             PHYSICIAN:                               
 
REFERRING PHYSICIAN: BARBARA RIDLEY MD               
DATE OF SERVICE: 19
Discharge Plan
 
 
Patient Name: ROCIO BLAIR
Facility: [facility]:[place]
Encounter #: [id number]
Medical Record #: [id number]
: 1947
Planned Disposition: Home with Home Health and Infusion Serv
Anticipated Discharge Date: 
 
Discharge Date: 
Expected LOS: 
Initial Reviewer: [id number]
Initial Review Date: 2019
Generated: 19   4:08 pm 
Comments
 
DCP- Discharge Planning
 
Updated by [id number]: Steph Charlton on 19   2:01 pm CT
[facility].  [facility] 
will be setting up the suction and all other supplies at the sisters home 
today when he is discharged home today. [place] will follow him with his 
PEG tube feedings. Teaching was done at the bedside with patient and sister 
with Trach care, suction ect by our RT, Melyssa.  Also peg tube feeding and 
instructions was completed with Robbie at [facility].  Patient's will follow up 
with a Dr Ellison in [place] on  @ 2:00. CM will continue follow 
and assist with DC planning as needed
DCP- Discharge Planning
 
Updated by [id number]: Steph Charlton on 4/15/19   3:46 pm CT
Spoke with Dr Hernandez this AM, he states he is ready for discharge if we can 
get home health set up. I called and spoke with patient and his sister (who 
he will be discharging home to) sister address is [address] in [facility]. 
 [facility] with [place] , They will be able to accept the patient.  Walk test 
ordered and he will not need O2 when he is discharged.   He has a peg tube, 
but since he is eating insurance will not cover his peg nutrition.  CM will 
have trach equipment set up for patient prior to discharge.  IMM served and 
explained. CM to follow and assist with DC plans
 
DCP- Discharge Planning
 
Updated by [id number]: Carly Newman on 19   1:58 pm CT
DR HERNANDEZ SPOKE W/ CM THIS EARLY AM.   
HE WILL BE REFERRING THE PATIENT TO DR JEROME GILLILAND AN ONCOLOGY HEAD AND NECK 
SURGEON LOCATED IN [place]. HE WILL PERSONALLY CALL DR ELLISON ON MONDAY TO 
EXPEDIATE THE REFERRAL.  
CM ADVISED THE PATIENT.  
CM CALLED MEDICAL IMAGING AND REQUESTED THE  PATIENT'S FILMS ON DISC. 
RADIOLOGY WILL DELIVER THE DISC TO THE UNIT ON .  
PACKET W/ CLINICAL INFORMATION PREPARED.  
CM SPOKE W/ RESPIRATORY THERAPIST, THOMAS, REGARDING PATIENT TEACHING. HE BEGAN 
TEACHING W/ THE PATIENT THIS AM. HE WILL DOCUMENT SUBJECT AND PROGRESS.  
CM SPOKE WITH THE PRIMARY NURSE, ELEANOR, TO UPDATE. DISCUSSED TEACHING NEEDS. 
SHE ALSO IS FAMILIAR W/ DR ELLISON AND WILL DISCUSS WITH THE PATIENT IS HE HAS 
ANY CONCERNS OR QUESTIONS.  
 STATED THE PATIENT MAYBE DISCHARGE TO HOME W/ COFFEY.  
CM WILL FOLLOW UP WITH THE PATIENT AND HIS SISTER REGARDING HOME HEALTH 
SERVICES AND PROVIDERS.  
CM RECEIVED CM CONSULT. INITIAL ASSESSMENT WAS DONE 19.  
CM FOLLOWING FOR DCP NEEDS.
DCP- Discharge Planning
 
Updated by [id number]: Ashley Adams on 19   5:34 pm CT
CM received call from Dr. Hernandez that patient is going to need laryngectomy. 
He states that the patient and sister will need teaching on trach care and 
tube feedings prior to discharge.  He states that he will most likely need 
home health.  He states that he wants when the patient is discharged to go 
see MD in [place] for l[place]. within the same week.  Planning on discharge 
early next week.  CM wasn't able to get in touch with patients sister for [facility] 
for Home Health services.  CM will continue to follow and assist as needed 
with discharge planning / needs.
DCP- Discharge Planning
 
Updated by [id number]: Ashley Adams on 19   5:22 pm CT
LATE ENTRY  19 @ 1300  
  
Patient Name: ROCIO BLAIR                                     
Admission Status: ER   
Accout number: [id number]                              
Admission Date: 2019   
: 1947                                                        
Admission Diagnosis:MALIGNANT NEOPLASM OF GLOTTIS   
Attending: BARBARA RIDLEY                                                
Current LOS:  4   
  
Anticipated DC Date:    
Planned Disposition: Home with Home Health and Infusion Serv   
Primary Insurance: [employer] CHOICE PPO [facility]   
  
  
Discharge Planning Comments: CM met with patient at bedside he was able to 
 
mouth and nod his head to answer questions.  He states he plans on living 
with his sister upon discharge. Has no medical equipment or previous home 
health services.  Patient gave CM permission to call sister Madhuri for any 
information needed.  CM will continue to follow and assist as needed with 
discharge planning / needs.  
  
  
  
  
  
  
: Ashley MIRANDA - Discharge Planning Initial Assessment
 
Updated by [id number]: Ashley Adasm on 19   6:05 pm
*  Is the patient Alert and Oriented?
Yes
*  PCP
NO PCP
*  Pharmacy
DENIES HAVING A PHARMACY
*  Preadmission Environment
Home Alone
*  ADLs
Independent
*  Equipment
None
*  List name and contact numbers for known caregivers / representatives who 
currently or will assist patient after discharge:
MADHURI AVITIA - - [phone]
*  Verbal permission to speak to the caregivers and representatives has been 
obtained from the patient.
Yes
*  Community resources currently utilized
None
*  Additional services required to return to the preadmission environment?
No
*  Can the patient safely return to the preadmission environment?
Yes
*  Has this patient been hospitalized within the prior 30 days at any 
hospital?
No
 
 
 
 
 
Coverage Notice
 
Reviewer: [email or web address] Eliecer Charlton
 
Notice Issued Date-Time: 04/15/2019  14:00
 
Notice Type: IM Discharge Notice
 
Notice Delivered To: Patient
Relationship to Patient: 
Representative Name: 
 
Delivery Method: HAND - Hand Delivered
Maria C Days:
Prior Verbal Notification: 
 
Recipient Understood Notice: Yes
Recipient Signature: Yes
Med Rec Note Co-signed by Attending:
 
Coverage Notice Comment:  
 
Last DP export: 19  11:40 a
Patient Name: ROCIO BLAIR
Encounter #: [id number]
Page [id number]
 
 
 
 
 
Electronically Signed by DARIANA PAUL on 19 at 1509
 
 
 
 
 
 
**All edits/amendments must be made on the electronic document**
 
DICTATION DATE: 19 1508     : FRANK  19 1508     
RPT#: [id number]                                DC DATE:        
                                               STATUS: ADM IN  
[facility]
[address] [place] [address]
***END OF REPORT***

## 2019-04-16 NOTE — MORECARE
CASE MANAGEMENT DISCHARGE SUMMARY
 
 
PATIENT: ROCIO BLAIR                       UNIT: [id number]
ACCOUNT#: [id number]                       ADM DATE: 19
AGE: 72     : 02/15/47  SEX: M            ROOM/BED: D.2216    
AUTHOR: HUMBERTO,DOC                             PHYSICIAN:                               
 
REFERRING PHYSICIAN: BARBARA RIDLEY MD               
DATE OF SERVICE: 19
Discharge Plan
 
 
Patient Name: ROCIO BLAIR
Facility: [facility]:[place]
Encounter #: [id number]
Medical Record #: [id number]
: 1947
Planned Disposition: Home with Home Health and Infusion Serv
Anticipated Discharge Date: 
 
Discharge Date: 
Expected LOS: 
Initial Reviewer: [id number]
Initial Review Date: 2019
Generated: 19   4:17 pm 
Comments
 
DCP- Discharge Planning
 
Updated by [id number]: Steph Charlton on 19   2:01 pm CT
[facility].  [facility] 
will be setting up the suction and all other supplies at the sisters home 
today when he is discharged home today. [facility] will follow him with his 
PEG tube feedings. Teaching was done at the bedside with patient and sister 
with Trach care, suction ect by our RT, Melyssa.  Also peg tube feeding and 
instructions was completed with Robbie at [facility].  Patient's will follow up 
with a Dr Ellison in [place] on  @ 2:00. CM will continue follow 
and assist with DC planning as needed
DCP- Discharge Planning
 
Updated by [id number]: Steph Charlton on 4/15/19   3:46 pm CT
Spoke with Dr Hernandez this AM, he states he is ready for discharge if we can 
get home health set up. I called and spoke with patient and his sister (who 
he will be discharging home to) sister address is [address] [address] [facility] in [facility]. 
 ÁLVARO with [place] , They will be able to accept the patient.  Walk test 
ordered and he will not need O2 when he is discharged.   He has a peg tube, 
but since he is eating insurance will not cover his peg nutrition.  CM will 
have trach equipment set up for patient prior to discharge.  IMM served and 
explained. CM to follow and assist with DC plans
 
DCP- Discharge Planning
 
Updated by [email or web address]: Carly Newman on 19   1:58 pm CT
DR HERNANDEZ SPOKE W/ CM THIS EARLY AM.   
HE WILL BE REFERRING THE PATIENT TO DR JEROME GILLILAND AN ONCOLOGY HEAD AND NECK 
SURGEON LOCATED IN [place]. HE WILL PERSONALLY CALL DR ELLISON ON MONDAY TO 
EXPEDIATE THE REFERRAL.  
CM ADVISED THE PATIENT.  
CM CALLED MEDICAL IMAGING AND REQUESTED THE  PATIENT'S FILMS ON DISC. 
RADIOLOGY WILL DELIVER THE DISC TO THE UNIT ON .  
PACKET W/ CLINICAL INFORMATION PREPARED.  
CM SPOKE W/ RESPIRATORY THERAPIST, THOMAS, REGARDING PATIENT TEACHING. HE BEGAN 
TEACHING W/ THE PATIENT THIS AM. HE WILL DOCUMENT SUBJECT AND PROGRESS.  
CM SPOKE WITH THE PRIMARY NURSE, ELEANOR, TO UPDATE. DISCUSSED TEACHING NEEDS. 
SHE ALSO IS FAMILIAR W/ DR ELLISON AND WILL DISCUSS WITH THE PATIENT IS HE HAS 
ANY CONCERNS OR QUESTIONS.  
 STATED THE PATIENT MAYBE DISCHARGE TO HOME W/ COFFEY.  
CM WILL FOLLOW UP WITH THE PATIENT AND HIS SISTER REGARDING HOME HEALTH 
SERVICES AND PROVIDERS.  
CM RECEIVED CM CONSULT. INITIAL ASSESSMENT WAS DONE 19.  
CM FOLLOWING FOR DCP NEEDS.
DCP- Discharge Planning
 
Updated by [email or web address]: Ashley Adams on 19   5:34 pm CT
CM received call from Dr. Hernandez that patient is going to need laryngectomy. 
He states that the patient and sister will need teaching on trach care and 
tube feedings prior to discharge.  He states that he will most likely need 
home health.  He states that he wants when the patient is discharged to go 
see MD in [place] for [place]. within the same week.  Planning on discharge 
early next week.  CM wasn't able to get in touch with patients sister for [facility] 
for Home Health services.  CM will continue to follow and assist as needed 
with discharge planning / needs.
DCP- Discharge Planning
 
Updated by [id number]: Ashley Adams on 19   5:22 pm CT
LATE ENTRY  19 @ 1300  
  
Patient Name: ROCIO BLAIR                                     
Admission Status: ER   
Accout number: [id number]                              
Admission Date: 2019   
: 1947                                                        
Admission Diagnosis:MALIGNANT NEOPLASM OF GLOTTIS   
Attending: BARBARA RIDLEY                                                
Current LOS:  4   
  
Anticipated DC Date:    
Planned Disposition: Home with Home Health and Infusion Serv   
Primary Insurance: HU[employer] CHOICE PPO [facility]   
  
  
Discharge Planning Comments: CM met with patient at bedside he was able to 
 
mouth and nod his head to answer questions.  He states he plans on living 
with his sister upon discharge. Has no medical equipment or previous home 
health services.  Patient gave CM permission to call sister Madhuri for any 
information needed.  CM will continue to follow and assist as needed with 
discharge planning / needs.  
  
  
  
  
  
  
: Ashley MIRANDA - Discharge Planning Initial Assessment
 
Updated by [id number]: Ashley Adams on 19   6:05 pm
*  Is the patient Alert and Oriented?
Yes
*  PCP
NO PCP
*  Pharmacy
DENIES HAVING A PHARMACY
*  Preadmission Environment
Home Alone
*  ADLs
Independent
*  Equipment
None
*  List name and contact numbers for known caregivers / representatives who 
currently or will assist patient after discharge:
MADHURI AVITIA - - [phone]
*  Verbal permission to speak to the caregivers and representatives has been 
obtained from the patient.
Yes
*  Community resources currently utilized
None
*  Additional services required to return to the preadmission environment?
No
*  Can the patient safely return to the preadmission environment?
Yes
*  Has this patient been hospitalized within the prior 30 days at any 
hospital?
No
 
 
 
 
 
Coverage Notice
 
Reviewer: [email or web address] Eliecer Charlton
 
Notice Issued Date-Time: 04/15/2019  14:00
 
Notice Type: IM Discharge Notice
 
Notice Delivered To: Patient
Relationship to Patient: 
Representative Name: 
 
Delivery Method: HAND - Hand Delivered
Maria C Days:
Prior Verbal Notification: 
 
Recipient Understood Notice: Yes
Recipient Signature: Yes
Med Rec Note Co-signed by Attending:
 
Coverage Notice Comment:  
 
Last DP export: 19   2:09 p
Patient Name: ROCIO BLAIR
Encounter #: [id number]
Page [id number]
 
 
 
 
 
Electronically Signed by DARIANA PAUL on 19 at 1518
 
 
 
 
 
 
**All edits/amendments must be made on the electronic document**
 
DICTATION DATE: 19     : FRANK  19     
RPT#: [id number]                                DC DATE:        
                                               STATUS: ADM IN  
[facility]
[address] [place] [address]
***END OF REPORT***

## 2019-04-18 NOTE — OP
PATIENT NAME:  ROCIO BLAIR                             MEDICAL RECORD: [id number]
:02/15/47                                             LOCATION:D.MS [id number]
                                                         ADMISSION DATE:19
SURGEON:  HARRIET ABDUL MD            
 
 
DATE OF OPERATION:  04/10/2019
 
PREOPERATIVE DIAGNOSES:
1.  Acute malnutrition.
2.  Dysphagia.
 
POSTOPERATIVE DIAGNOSES:
1.  Acute malnutrition.
2.  Dysphagia.
3.  LA grade I distal esophagitis.
 
PROCEDURES:
1.  Esophagogastroduodenoscopy with antral biopsies to check for H. pylori.
2.  Percutaneous endoscopic gastrostomy tube placement, 20-Vietnamese.
 
SURGEON:  Harriet Abdul MD
 
ASSISTANT:  None.
 
BLOOD LOSS:  Minimal.
 
ANESTHESIA:  Local with IV sedation.
 
COMPLICATIONS:  None.
 
The risks, possible complications and alternatives to the procedure were
explained to the patient.  He elects to proceed.
 
OPERATIVE COURSE:  The patient was seen in his ICU bed.  IV sedation was induced
by the anesthesia staff.  A bite block was inserted.  The abdomen was sterilely
prepped and draped.  A gastroscope was inserted into the mouth.  It was advanced
easily into the hypopharynx.  The esophagus was easily intubated as were the
stomach and duodenum.  Upon withdrawal, retroflexed and angulus views were
obtained.  Antral biopsies were obtained.
 
I then indented the anterior abdominal wall skin.  I was able to visualize this
endoscopically.  An incision was accomplished to the left upper quadrant. 
Through the incision, an Angiocath was advanced and I punctured the fundus of
the stomach.  A wire was advanced.  This was grasped with an endoscopic snare
and was withdrawn out through the mouth.  The wire was attached to a pull-type
gastrostomy tube, which was then pulled into place.  I then re-endoscoped the
patient's esophagus and stomach.  There had been no evidence of false passage or
perforation.  The endoscope was then withdrawn under direct vision.
 
Hub and flange devices were attached.
 
I will plan that we can begin using the gastrostomy tube tomorrow.
 
TRANSINT:[id number] Voice Confirmation ID: [id number] DOCUMENT ID: [id number]
 
 
 
OPERATIVE REPORT                               [id number]    ROCIO BLAIR           
 
 
                                           
                                           HARRIET ABDUL MD            
 
 
 
Electronically Signed by HARRIET ABDUL on 19 at 1642
 
 
 
 
 
 
 
 
 
 
 
 
 
 
 
 
 
 
 
 
 
 
 
 
 
 
 
 
 
 
 
 
 
 
 
 
 
 
 
 
 
CC: BARBARA RIDLEY MD and CONNOR CHARLES MD                          [id number]
DICTATION DATE: 04/10/19 150     :     04/10/19 1549      DIS IN  
                                                                      19
[facility]                                          
[address] [place] [address]

## 2019-04-18 NOTE — MORECARE
CASE MANAGEMENT DISCHARGE SUMMARY
 
 
PATIENT: ROCIO BLAIR                       UNIT: [id number]
ACCOUNT#: [id number]                       ADM DATE: 19
AGE: 72     : 02/15/47  SEX: M            ROOM/BED: D.2216    
AUTHOR: HUMBERTO,DOC                             PHYSICIAN:                               
 
REFERRING PHYSICIAN: BARBARA RIDLEY MD               
DATE OF SERVICE: 19
Discharge Plan
 
 
Patient Name: ROCIO BLAIR
Facility: [facility]:[place]
Encounter #: [id number]
Medical Record #: [id number]
: 1947
Planned Disposition: Home with Home Health and Infusion Serv
Anticipated Discharge Date: 
 
Discharge Date: 2019
Expected LOS: 
Initial Reviewer: [email or web address]
Initial Review Date: 2019
Generated: 19   5:26 pm 
Comments
 
DCP- Discharge Planning
 
Updated by [id number]: Steph Charlton on 19   2:01 pm CT
Patient will be discharging home today with [facility].  [facility] 
will be setting up the suction and all other supplies at the sisters home 
today when he is discharged home today. [facility] will follow him with his 
PEG tube feedings. Teaching was done at the bedside with patient and sister 
with Trach care, suction ect by our RT, Melyssa.  Also peg tube feeding and 
instructions was completed with Robbie at [facility].  Patient's will follow up 
with a Dr Ellison in [place] on  @ 2:00. CM will continue follow 
and assist with DC planning as needed
DCP- Discharge Planning
 
Updated by [email or web address]: Steph Charlton on 4/15/19   3:46 pm CT
Spoke with Dr Hernandez this AM, he states he is ready for discharge if we can 
get home health set up. I called and spoke with patient and his sister (who 
he will be discharging home to) sister address is [address] [address] in [place]. 
 ÁLVARO with [facility] , They will be able to accept the patient.  Walk test 
ordered and he will not need O2 when he is discharged.   He has a peg tube, 
but since he is eating insurance will not cover his peg nutrition.  CM will 
have trach equipment set up for patient prior to discharge.  IMM served and 
explained. CM to follow and assist with DC plans
 
DCP- Discharge Planning
 
Updated by [id number]: Carly Newman on 19   1:58 pm CT
DR HERNANDEZ SPOKE W/ CM THIS EARLY AM.   
HE WILL BE REFERRING THE PATIENT TO DR JEROME GILLILAND [facility] 
[facility]UR[facility] LOCATED IN [place]. HE WILL PERSONALLY CALL DR ELLISON ON MONDAY TO 
EXPEDIATE THE REFERRAL.  
CM ADVISED THE PATIENT.  
CM CALLED [facility] AND REQUESTED THE  PATIENT'S FILMS ON DISC. 
RADIOLOGY WILL DELIVER THE DISC TO THE UNIT ON .  
PACKET W/ CLINICAL INFORMATION PREPARED.  
CM SPOKE W/ RESPIRATORY THERAPIST, THOMAS, REGARDING PATIENT TEACHING. HE BEGAN 
TEACHING W/ THE PATIENT THIS AM. HE WILL DOCUMENT SUBJECT AND PROGRESS.  
CM SPOKE WITH THE PRIMARY NURSE, ELEANOR, TO UPDATE. DISCUSSED TEACHING NEEDS. 
SHE ALSO IS FAMILIAR W/ DR ELLISON AND WILL DISCUSS WITH THE PATIENT IS HE HAS 
ANY CONCERNS OR QUESTIONS.  
 STATED THE PATIENT MAYBE DISCHARGE TO HOME W/ ERLINDA.  
CM WILL FOLLOW UP WITH THE PATIENT AND HIS SISTER REGARDING HOME HEALTH 
SERVICES AND PROVIDERS.  
CM RECEIVED CM CONSULT. INITIAL ASSESSMENT WAS DONE 19.  
CM FOLLOWING FOR DCP NEEDS.
DCP- Discharge Planning
 
Updated by [id number]: Ashley Adams on 19   5:34 pm CT
CM received call from Dr. Hernandez that patient is going to need laryngectomy. 
He states that the patient and sister will need teaching on trach care and 
tube feedings prior to discharge.  He states that he will most likely need 
home health.  He states that he wants when the patient is discharged to go 
see MD in [place] for laryngectomy. within the same week.  Planning on discharge 
early next week.  CM wasn't able to get in touch with patients sister for [facility] 
for Home Health services.  CM will continue to follow and assist as needed 
with discharge planning / needs.
DCP- Discharge Planning
 
Updated by [id number]: Ashley Adams on 19   5:22 pm CT
LATE ENTRY  19 @ 1300  
  
Patient Name: ROCIO BLAIR                                     
Admission Status: ER   
Accout number: [id number]                              
Admission Date: 2019   
: 1947                                                        
Admission Diagnosis:MALIGNANT NEOPLASM OF GLOTTIS   
Attending: BARBARA RIDLEY                                                
Current LOS:  4   
  
Anticipated DC Date:    
Planned Disposition: Home with Home Health and Infusion Serv   
Primary Insurance: [employer] CHOICE PPO [facility]   
  
  
Discharge Planning Comments: CM met with patient at bedside he was able to 
 
mouth and nod his head to answer questions.  He states he plans on living 
with his sister upon discharge. Has no medical equipment or previous home 
health services.  Patient gave CM permission to call sister Madhuri for any 
information needed.  CM will continue to follow and assist as needed with 
discharge planning / needs.  
  
  
  
  
  
  
: Ashley Furr
 DCPIA - Discharge Planning Initial Assessment
 
Updated by [id number]: Ashley Adams on 19   6:05 pm
*  Is the patient Alert and Oriented?
Yes
*  PCP
NO PCP
*  Pharmacy
DENIES HAVING A PHARMACY
*  Preadmission Environment
Home Alone
*  ADLs
Independent
*  Equipment
None
*  List name and contact numbers for known caregivers / representatives who 
currently or will assist patient after discharge:
MADHURI AVITIA - - [phone]
*  Verbal permission to speak to the caregivers and representatives has been 
obtained from the patient.
Yes
*  Community resources currently utilized
None
*  Additional services required to return to the preadmission environment?
No
*  Can the patient safely return to the preadmission environment?
Yes
*  Has this patient been hospitalized within the prior 30 days at any 
hospital?
No
 
 
 
 
 
Coverage Notice
 
Reviewer: [email or web address] Eliecer Charlton
 
Notice Issued Date-Time: 04/15/2019  14:00
 
Notice Type: IM Discharge Notice
 
Notice Delivered To: Patient
Relationship to Patient: 
Representative Name: 
 
Delivery Method: HAND - Hand Delivered
Maria C Days:
Prior Verbal Notification: 
 
Recipient Understood Notice: Yes
Recipient Signature: Yes
Med Rec Note Co-signed by Attending:
 
Coverage Notice Comment:  
 
Last DP export: 19   2:18 p
Patient Name: ROCIO BLAIR
Encounter #: [id number]
Page [id number]
 
 
 
 
 
Electronically Signed by DARIANA PAUL on 19 at 1626
 
 
 
 
 
 
**All edits/amendments must be made on the electronic document**
 
DICTATION DATE: 19 1625     : FRANK  19 1625     
RPT#: [id number]                                DC DATE:19
                                               STATUS: DIS IN  
[facility]
[address] [place] [address]
***END OF REPORT***

## 2019-09-27 NOTE — NUR
ALERT AND ORIENTED. HAS A OLD TRAC SO IS UNABLE TO TALK LASHELL LOUD. HE IS ON
ROOM AIR AND NO TELEMERTY. COFFEY CATH TO BEDSIDE DRAINAGE. JASMINE TO BOTH LEGS.
SR UP WITH CALL LIGHT IN REACH

## 2019-09-27 NOTE — NUR
RECEIVED REPORT, WILL ASSUME CARE OF PT, HELPED REPOSTION PT UP IN BED, BED IS
LOW, SRX2, CALL LIGHT IN REACH, WILL CONTINUE PLAN OF CARE

## 2019-09-27 NOTE — NUR
Rehab Prescreening Consult recieved and the chart has been reviewed.  He is
[facility] managed Medicare, and will require a preauth for rehab.  Once his PT
and OT evals are completed all information will be submitted to [facility] for
their review.
Evita Combs RN
Clinical Liaison, Rehab

## 2019-09-28 NOTE — NUR
RECEIVED REPORT, WILL ASSUME CARE OF PT, SLEEPING, BED IS LOW, SRX2, CALL
LIGHT IN REACH, WILL CONTINUE PLAN OF CARE

## 2019-09-29 NOTE — NUR
RECEIVED REPORT, WILL ASSUME CARE OF PT, SLEEPING, NO DISTRESS NOTICE AT THIS
TIME, BED IS LOW, SRX2, CALL LIGHT IN REACH, WILL CONTINUE PLAN OF CARE

## 2019-09-30 NOTE — NUR
Nutrition Follow-up:
Ate 75% of meal this AM. Reports drinking Boost with meals. Awaiting
placement.
Diet: Cardiac, Boost with meals
Wt: 184#
Last BM: 9/27
Labs reviewed
Meds reviewed
Continue current diet/supplement as tolerated. RD following.

## 2019-09-30 NOTE — NUR
ALERT AND ORIENTED/. PT HAS A TRACK THAT IS CAPPED.
LEFT WRIST SL. COFFEY PATENT TO GRAVITY BAG. LEGS ARE WEEPING,  SKIN BREAKING
DOWN IN CRACK BETWEEN BUTTOCKS. DENIES ANY NEEDS. SR UP WITH CALL LIGHT IN
REACH

## 2019-10-01 NOTE — NUR
PATIENT IS LAYING IN BED AND WATCHING TV. BREATHING IS EVEN AND
UNLABORED. PATIENT DENIES ANY PAIN OR CONCERNS AT THIS TIME. BED IS IN THE
LOWEST POSITION AND CALL LIGHT WITHIN REACH. WILL CONTINUE TO MONITOR.

## 2019-10-01 NOTE — NUR
PATIENT BACK FROM SURGERY. ALERT AND ORIENTED. V/S STABLE  B/P 122/60
RESP 18, HR 80 AND O2 ST AT 98, URINE SLIGHTLY RED. DENIES ANY NEES. SR UP
WITH CALL LIGHT IN REACH. WILL MONITOR

## 2019-10-01 NOTE — NUR
PATIENT IS WATCHING TV. DENIES ANY PAIN OR CONCERNS AT THIS TIME. BREATHING IS
EVEN AND UNLABORED. NO VISIBLE SIGNS OF DISTRESS. COFFEY IN PLACE AND PATENT.

## 2019-10-01 NOTE — NUR
AWAKE AND ALERT. PT HAS A TRAC THAT IS CAPPED. UNABLE TO TALK. RIGHT WRIST SL.
ON ROOM AIR. LEGS WITH SORES AND BUTT WITHBREAK DOWN. PT CAME TO US LIKE THIS.
SISTER STATES THAT HE WONT WALK, TURN OR KEEP LEGS ELEVATED AT HOME. I
ENCUOURAGED PT TO TURN, ELEVATE HIS LEGS AND TO GET OUT OF BED . NPO FOR TURP
AND BIOSPY. SR UP WITH CALL LIGHT IN REACH

## 2019-10-01 NOTE — NUR
Rehab Note- Received call from Maria with [facility] stating that their medical
director had reviewed the patient's medical receord & that it lack the support
for inpatient acute rehab. A peer to peer can be set up by calling
[phone] by Friday 10/4 @ 1100AM. Spoke with CAPRI Sapp. Thank you for this
referral!
Pascale Peña RN
Clincial Liaison, [facility]

## 2019-10-01 NOTE — NUR
OT NOTE: PT REPORTED NOT FEELING WELL TODAY. PROVIDED PT WITH WASH CLOTH AND
HE WAS ABLE TO WASH FACE, HANDS, AND UPPER BODY. SUPINE TO SIT WITH MIN/MOD
ASSIST. STATIC SITTING WITH GOOD BALANCE. AROM EXS WITH FREQ REST BREAKS. PT
FATIGUED EASILY TODAY. SIT TO STAND WITH MIN/MOD ASSIST. ONLY ABLE TO TAKE
APPROX 7 STEPS AND REPORTED THAT HE FELT DIZZY. TRANSFERRED TO CHAIR WITH MIN
ASSIST.
ARMIDA DHALIWAL, OTR/L

## 2019-10-02 NOTE — MORECARE
CASE MANAGEMENT DISCHARGE SUMMARY
 
 
PATIENT: ROCIO BLAIR                       UNIT: [id number]
ACCOUNT#: [id number]                       ADM DATE: 19
AGE: 72     : 02/15/47  SEX: M            ROOM/BED: D.2125    
AUTHOR: DARIANA PAUL                             PHYSICIAN:                               
 
REFERRING PHYSICIAN: BARBARA RIDLEY MD               
DATE OF SERVICE: 10/02/19
Discharge Plan
 
 
Patient Name: ROCIO BLAIR
Facility: [facility]:[place]
Encounter #: [id number]
Medical Record #: [id number]
: 1947
Planned Disposition: Inpatient Rehab
Anticipated Discharge Date: 10/2/19
 
Discharge Date: 
Expected LOS: 6
Initial Reviewer: [email or web address]
Initial Review Date: 10/02/2019
Generated: 10/2/19   1:37 pm 
  
 
 
 
 
 
 
 
Patient Name: ROCIO BLAIR
 
Encounter #: [id number]
Page [id number]
 
 
 
 
 
Electronically Signed by DARIANA PAUL on 10/02/19 at 1238
 
 
 
 
 
 
**All edits/amendments must be made on the electronic document**
 
DICTATION DATE: 10/02/19 1237     : FRANK  10/02/19 1237     
RPT#: [id number]                                DC DATE:        
                                               STATUS: ADM IN  
[facility]
 [place] [address]
***END OF REPORT***

## 2019-10-02 NOTE — NUR
PATIENT IS RESTING IN BED WATCHING TV. DENIES ANY PAIN OR CONCERNS AT THIS
TIME. BREATHING IS EVEN AND UNLABORED. BED IS IN THE LOWEST POSITON AND CALL
LIGHT IS IN REACH. WILL CONTINUE TO MONITOR.

## 2019-10-02 NOTE — NUR
OT NOTE:  PERFORMED AM ADLS. PRACTICED BED MOB WITH MIN ASSIST AND CUES FOR
POSITIONING TO IMPROVE INDEP WITH SUPINE TO SIT. AMB IN ROOM AND INTO HALLWAY
WITH IV, GAIT BELT, WALKER, AND MIN ASSIST. TRANSFERRED TO CHAIR WITH MIN
ASSIST. PROVIDED BASIN AND CLEANING ITEMS. PT ABLE TO WASH FACE, HANDS, UPPER
BODY, AND CHEST. ABLE TO BATHE UPPER LEGS BUT MAX ASSIST WITH LOWER LEGS AND
FEET. RECOMMEND PODIATRIST FOR TOENAIL CARE. SIMPLE GROOMING WITH SET UP.
ARMIDA DHALIWAL, OTR/L

## 2019-10-02 NOTE — MORECARE
CASE MANAGEMENT DISCHARGE SUMMARY
 
 
PATIENT: ROCIO BLAIR                       UNIT: [id number]
ACCOUNT#: [id number]                       ADM DATE: 19
AGE: 72     : 02/15/47  SEX: M            ROOM/BED: D.2125    
AUTHOR: DARIANA PAUL                             PHYSICIAN:                               
 
REFERRING PHYSICIAN: BARBARA RIDLEY MD               
DATE OF SERVICE: 10/02/19
Discharge Plan
 
 
Patient Name: ROCIO BLAIR
[facility]
Encounter #: [id number]
Medical Record #: [id number]
: 1947
Planned Disposition: Skilled Nursing Facility
Anticipated Discharge Date: 10/3/19
 
Discharge Date: 
Expected LOS: 7
Initial Reviewer: [id number]
Initial Review Date: 10/02/2019
Generated: 10/2/19   4:58 pm 
 DCPIA - Discharge Planning Initial Assessment
 
Updated by [id number]: Braulio Kovacs on 10/2/19   3:50 pm
*  Is the patient Alert and Oriented?
Yes
*  How many steps to enter\exit or inside your home? NONE *  PCP DR. NIELSEN IN
[place]
*  Pharmacy
[facility] IN [place]
*  Preadmission Environment
Home with Family
*  ADLs
Independent
*  Equipment
Cane
Walker
*  Other Equipment
NO MEDICAL EQUIPMENT PROVIDER PREFERENCE
*  List name and contact numbers for known caregivers / representatives who 
currently or will assist patient after discharge:
LUCIE AVITIA, SISTER, [phone]
*  Verbal permission to speak to the caregivers and representatives has been 
obtained from the patient.
Yes
 
*  Community resources currently utilized
None
*  Please name any agencies selected above.
NONE
*  Additional services required to return to the preadmission environment?
No
*  Can the patient safely return to the preadmission environment?
Yes
*  Has this patient been hospitalized within the prior 30 days at any 
hospital?
No
 
 
 
 
 
 
 
Last DP export: 10/2/19  11:38 a
Patient Name: ROCIO BLAIR
Encounter #: [id number]
Page [id number]
 
 
 
 
 
Electronically Signed by DARIANA PAUL on 10/02/19 at 1559
 
 
 
 
 
 
**All edits/amendments must be made on the electronic document**
 
DICTATION DATE: 10/02/19 1558     : FRANK  10/02/19 1558     
RPT#: [id number]                                DC DATE:        
                                               STATUS: ADM IN  
[address] [address] [place] [address]
***END OF REPORT***

## 2019-10-02 NOTE — OP
PATIENT NAME:  ROCIO BLAIR                             MEDICAL RECORD: [id number]
:02/15/47                                             LOCATION:D.     D.2125
                                                         ADMISSION DATE:19
SURGEON:  CONNER PERRY MD              
 
 
DATE OF OPERATION:  10/01/2019
 
SURGEON:  Conner Perry MD
 
ANESTHESIA:  TIVA by Sukhi Davis CRNA
 
DIAGNOSES:  Acute renal failure due to urinary retention, bladder outlet
obstruction, elevated PSA of 17.85.
 
PROCEDURES:  Cystoscopy, transrectal ultrasound, and prostate biopsy.
 
FINDINGS:  On cystoscopy, long obstructive bilateral lateral lobes of the
prostate.  Tall bladder neck.  No bladder tumors are seen.  There is a
trabeculated bladder with single ureteral orifices bilaterally.  On transrectal
ultrasound, the prostate sizing 57 grams with multiple hypoechoic regions
within.
 
SPECIMENS:  Prostate biopsy cores.
 
BLOOD LOSS:  Minimal.
 
CLINICAL HISTORY:  This is a 72-year-old male, who is presenting to the
Emergency Room with acute renal failure and generalized edema.  On ultrasound,
he was found to have bilateral hydroureteronephrosis and a very distended
bladder.  His prostate was also found to be enlarged.  A Collazo catheter was
placed and his creatinine went from 7, which was his presenting creatinine, down
to 0.9.  His PSA was obtained and it was elevated at 17.85.  He had a urine
culture, which showed no growth.  He has had a previous laryngectomy and he is
not able to give any sort of history.  He comes today to have a prostate biopsy
performed.  He was given Ancef on call to the OR.
 
DESCRIPTION OF PROCEDURE:  The patient was given IV sedation.  He was then
placed into lithotomy position.  His indwelling Collazo catheter was removed.  He
was then prepped and draped.
 
Cystoscopy was performed using a 21-French cystoscope with 30-degree lens. 
There are no penile urethral strictures.  The prostatic urethra was quite
obstructed with long lateral lobes and then tight bladder neck.  Going into the
bladder, there are single ureteral orifices bilaterally.  The bladder was
trabeculated.  No bladder tumors were seen.  The scope was then removed.
 
The transrectal ultrasound probe was introduced.  Prostatic size and
measurements were obtained and we obtained a size estimate of 57 grams. 
Internal hypoechoic areas were seen within the prostate.  Sextant biopsies were
obtained with at least 3 cores from each sextant.  Once all the specimens were
obtained, the procedure was terminated.
 
The patient was then brought back to the recovery room and from there he will
come back to his hospital teresa.
 
TRANSINT:[id number] Voice Confirmation ID: [id number] DOCUMENT ID: [id number]
 
 
 
OPERATIVE REPORT                               [id number]    ROCIO BLAIR ROBERT S MD              
 
 
 
Electronically Signed by CONNER PERRY on 10/02/19 at 1047
 
 
 
 
 
 
 
 
 
 
 
 
 
 
 
 
 
 
 
 
 
 
 
 
 
 
 
 
 
 
 
 
 
 
 
 
 
 
 
 
 
CC:                                                             [id number]
DICTATION DATE: 10/01/19 1538     :     10/02/19 0034      ADM IN  
                                                                              
[address] [place] [address]

## 2019-10-02 NOTE — NUR
OT NOTE: PT COMPLETED ADL MOB WITH SBA/CGA. PT COMPLETED SIT TO STAND WITH
SBA/CGA. PT COMPLETED TOILETING AND HYGIENE TASKS WITH SBA.
THANK YOU, KRYSTYNA VIERA

## 2019-10-02 NOTE — MORECARE
CASE MANAGEMENT DISCHARGE SUMMARY
 
 
PATIENT: ROCIO BLAIR                       UNIT: [id number]
ACCOUNT#: [id number]                       ADM DATE: 19
AGE: 72     : 02/15/47  SEX: M            ROOM/BED: D.9333    
AUTHOR: HUMBERTO,DOC                             PHYSICIAN:                               
 
REFERRING PHYSICIAN: BARBARA RIDLEY MD               
DATE OF SERVICE: 10/02/19
Discharge Plan
 
 
Patient Name: ROCIO BLAIR
Facility: [facility]:[place]
Encounter #: [id number]
Medical Record #: [id number]
: 1947
Planned Disposition: Skilled Nursing Facility
Anticipated Discharge Date: 10/3/19
 
Discharge Date: 
Expected LOS: 7
Initial Reviewer: [id number]
Initial Review Date: 10/02/2019
Generated: 10/2/19   5:06 pm 
Comments
 
DCP- Discharge Planning
 
Updated by [id number]: Braulio Jackson on 10/2/19   3:02 pm CT
Patient Name:  ROCIO BLAIR   
Encounter No:  [id number]   
:  1947   
Primary Insurance:  [employer] CHOICE PPO [employer]  
Anticipated DC Date: 10-   
Planned Disposition:  Skilled Nursing Facility  
External Planned Provider:  [facility]HAB BED  
  
  
DCP follow-up note:   
CM RECEIVED CALL FROM [facility] WHO INFORMED CM THAT 
INSURANCE HAS DECLINED PT AND THAT A PEER TO PEER COULD BE DONE.  CM SPOKE TO 
DR. DUARTE WHO AGREED TO DO PEER TO PEER.  CM CALLED INSURANCE COMPANY AND 
ARRANGED FOR INSURANCE TO CALL DR. DUARTE TODAY.  CM MET WITH PT IN ROOM TO 
DISCUSS DISCHARGE PLANNING AND NEEDS. PT WAS UNABLE TO SPEAK AND CM WAS ABLE 
TO READ PT'S LIPS.  PT REPORTS LIVING AT HOME INDEPENDENTLY WITH HIS ADULT 
SISTER.  PT STATES REHAB WHEN ASKED ABOUT DISCHARGE PLAN AND DIRECTED CM TO 
CALL HIS SISTER.  CM CALLED PT'S SISTER, LUCIE AVITIA, [phone].  LUCIE  
REPORTS PT LIVES WITH THEM AND HE NEEDS REHAB AND IF AT NOT AT INPATIENT 
 
REHAB, THEN AT [facility].  CHOICE LETTER COMPLETED.  LUCIE 
REPORTS THAT IF PT GETS TO THE POINT HE CAN CARE FOR HIMSELF, THE GOAL IS TO 
GET HIM INTO A GOVERNMENT SUBSUDIZED APARTMENT.  IF NOT, PT MAY NEED LONG 
TERM CARE.  CM LATER INFORMED THAT PEER TO PEER HAD BEEN DONE AND INSURANCE 
DECLINED INPATIENT REHAB SERVICES.  
  
CM CALLED [facility] AT [facility], [phone], NOTIFIED OF REFERRAL.  CM FAXED 
REFERRAL FOR REHAB TO [facility] [phone].  CM WAITING ADMISSION 
DETERMINATION FROM [facility] IN [place] AS WELL AS INSURANCE DETERMINATION FOR 
SKILLED REHAB SERVICES.  
  
BRAULIO JACKSON, CASE MANAGEMENT
 DCPIA - Discharge Planning Initial Assessment
 
Updated by [id number]: Braulio Jackson on 10/2/19   3:50 pm
*  Is the patient Alert and Oriented?
Yes
*  How many steps to enter\exit or inside your home? NONE *  PCP DR. NIELSEN IN
[place]
*  Pharmacy
[facility] IN [place]
*  Preadmission Environment
Home with Family
*  ADLs
Independent
*  Equipment
Cane
Walker
*  Other Equipment
NO MEDICAL EQUIPMENT PROVIDER PREFERENCE
*  List name and contact numbers for known caregivers / representatives who 
currently or will assist patient after discharge:
LUCIE AVITIA, SISTER, [phone]
*  Verbal permission to speak to the caregivers and representatives has been 
obtained from the patient.
Yes
*  Community resources currently utilized
None
*  Please name any agencies selected above.
NONE
*  Additional services required to return to the preadmission environment?
No
*  Can the patient safely return to the preadmission environment?
Yes
*  Has this patient been hospitalized within the prior 30 days at any 
hospital?
No
 
External Providers
External Provider: [facility]
 
Next Contact Date: 10/02/2019
 
Service Request Date: 
Service Type: 
Resolution: 
 
Reviewer: 
Comments: 
 
 
 
 
 
 
Last DP export: 10/2/19   2:59 p
Patient Name: ROCIO BLAIR
Encounter #: [id number]
Page [id number]
 
 
 
 
 
Electronically Signed by DARIANA PAUL on 10/02/19 at 1607
 
 
 
 
 
 
**All edits/amendments must be made on the electronic document**
 
DICTATION DATE: 10/02/19 160     : FRANK  10/02/19 1606     
RPT#: [id number]                                DC DATE:        
                                               STATUS: ADM IN  
[facility]
[address] [place] [address]
***END OF REPORT***

## 2019-10-03 NOTE — MORECARE
CASE MANAGEMENT DISCHARGE SUMMARY
 
 
PATIENT: ROCIO BLAIR                       UNIT: [id number]
ACCOUNT#: [id number]                       ADM DATE: 19
AGE: 72     : 02/15/47  SEX: M            ROOM/BED: D.3310    
AUTHOR: HUMBERTO,DOC                             PHYSICIAN:                               
 
REFERRING PHYSICIAN: BARBARA RIDLEY MD               
DATE OF SERVICE: 10/03/19
Discharge Plan
 
 
Patient Name: ROCIO BLAIR
Facility: [facility]:[place]
Encounter #: [id number]
Medical Record #: [id number]
: 1947
Planned Disposition: Skilled Nursing Facility
Anticipated Discharge Date: 10/4/19
 
Discharge Date: 
Expected LOS: 8
Initial Reviewer: [email or web address]
Initial Review Date: 10/02/2019
Generated: 10/3/19   6:45 pm 
Comments
 
DCP- Discharge Planning
 
Updated by [id number]: Braulio Kovacs on 10/3/19   4:39 pm CT
Patient Name:  ROCIO BLAIR   
Encounter No:  [id number]   
:  1947   
Primary Insurance:  [employer] CHOICE PPO MCR ADVANT  
Anticipated DC Date: 10-   
Planned Disposition:  Skilled Nursing Facility  
[facility] Provider[facility]ARE REHAB BED  
  
  
CM RECEIVED CALL FROM BELKYS AT [facility], [phone], NOTIFIED THEY WILL 
ACCEPT TOMORROW MORNING, WILL ARRANGE VAN  EARLY. CM NOTIFIED PT IN 
ROOM AND PT'S SISTER, LUCIE AVITIA, VIA PHONE, WHO IS IN AGREEMENT WITH 
DISCHARGE PLAN.   ELI VERDE NOTIFIED.  
  
FOR DISCHARGE, FAX DISCHARGE INFORMATION TO [facility] [phone].  NURSE 
REPORT TO BE CALLED TO [facility] [phone]. [facility] TO ARRANGE VAN  
FIRST THING IN THE MORNING, 10-04-19.  
  
Braulio Kovacs, CASE MANAGEMENT
 
DCP- Discharge Planning
 
Updated by [id number]: Braulio Kovacs on 10/3/19  11:26 am CT
Patient Name:  ROCIO BLAIR   
Encounter No:  [id number]   
:  1947   
Primary Insurance:  [employer] CHOICE PPO MCR ADVANT  
Anticipated DC Date: 10-   
Planned Disposition:  Skilled Nursing Facility  
External Planned Provider: [facility]  
  
  
DCP follow-up note: CM RETURNED CALL TO NANY AT [facility], [phone], 
[facility] HAS SUBMITTED FOR INSURANCE AUTHORIZATION.    
  
CM WAITING ADMISSION DETERMINATION FROM [facility] IN [place] AS WELL AS 
INSURANCE DETERMINATION FOR SKILLED REHAB SERVICES.  
  
ARMAAN THORNE
DCP- Discharge Planning
 
Updated by [id number]: Braulio Kovacs on 10/2/19   3:02 pm CT
Patient Name:  ROCIO BLAIR   
Encounter No:  [id number]   
:  1947   
Primary Insurance:  [employer] CHOICE PPO MCR ADVANT  
Anticipated DC Date: 10-   
Planned Disposition:  Skilled Nursing Facility  
External Planned Provider:  [facility] AND REH[facility], MEDICARE REHAB BED  
  
  
DCP follow-up note:   
CM RECEIVED CALL FROM [facility] OF INPATIENT REHAB WHO INFORMED CM THAT 
INSURANCE HAS DECLINED PT AND THAT A PEER TO PEER COULD BE DONE.  CM SPOKE TO 
DR. DUARTE WHO AGREED TO DO PEER TO PEER.  CM CALLED INSURANCE COMPANY AND 
ARRANGED FOR INSURANCE TO CALL DR. DUARTE TODAY.  CM MET WITH PT IN ROOM TO 
DISCUSS DISCHARGE PLANNING AND NEEDS. PT WAS UNABLE TO SPEAK AND CM WAS ABLE 
TO READ PT'S LIPS.  PT REPORTS LIVING AT HOME INDEPENDENTLY WITH HIS ADULT 
SISTER.  PT STATES REHAB WHEN ASKED ABOUT DISCHARGE PLAN AND DIRECTED CM TO 
CALL HIS SISTER.  CM CALLED PT'S SISTER, LUCIE AVITIA, [phone].  LUCIE  
REPORTS PT LIVES WITH THEM AND HE NEEDS REHAB AND IF AT NOT AT INPATIENT 
REHAB, THEN AT [facility] IN [place].  CHOICE LETTER COMPLETED.  LUCIE 
REPORTS THAT IF PT GETS TO THE POINT HE CAN CARE FOR HIMSELF, THE GOAL IS TO 
GET HIM INTO A GOVERNMENT SUBSUDIZED APARTMENT.  IF NOT, PT MAY NEED LONG 
TERM CARE.  CM LATER INFORMED THAT PEER TO PEER HAD BEEN DONE AND INSURANCE 
DECLINED INPATIENT REHAB SERVICES.  
  
CM CALLED BELKYS AT [facility], [phone], NOTIFIED OF REFERRAL.  CM FAXED 
REFERRAL FOR REHAB TO [facility] [phone].  CM WAITING ADMISSION 
DETERMINATION FROM [facility] IN [place] AS WELL AS INSURANCE DETERMINATION FOR 
SKILLED REHAB SERVICES.  
  
 
ARMAAN THORNE
 DCPIA - Discharge Planning Initial Assessment
 
Updated by [id number]: Braulio Kovacs on 10/2/19   3:50 pm
*  Is the patient Alert and Oriented?
Yes
*  How many steps to enter\exit or inside your home? NONE *  PCP DR. NIELSEN IN
[place]
*  Pharmacy
[facility] IN [place]
*  Preadmission Environment
Home with Family
*  ADLs
Independent
*  Equipment
Cane
Walker
*  Other Equipment
NO MEDICAL EQUIPMENT PROVIDER PREFERENCE
*  List name and contact numbers for known caregivers / representatives who 
currently or will assist patient after discharge:
LUCIE AVITIA, SISTER, [phone]
*  Verbal permission to speak to the caregivers and representatives has been 
obtained from the patient.
Yes
*  Community resources currently utilized
None
*  Please name any agencies selected above.
NONE
*  Additional services required to return to the preadmission environment?
No
*  Can the patient safely return to the preadmission environment?
Yes
*  Has this patient been hospitalized within the prior 30 days at any 
hospital?
No
 
 
 
 
 
 
 
Last DP export: 10/3/19  11:32 a
Patient Name: ROCIO BLAIR
 
Encounter #: [id number]
Page [id number]
 
 
 
 
 
Electronically Signed by DARIANA PAUL on 10/03/19 at 1746
 
 
 
 
 
 
**All edits/amendments must be made on the electronic document**
 
DICTATION DATE: 10/03/19 1745     : FRANK  10/03/19 1745     
RPT#: [id number]                                DC DATE:        
                                               STATUS: ADM IN  
[facility]
 [place] [address]
***END OF REPORT***

## 2019-10-03 NOTE — MORECARE
CASE MANAGEMENT DISCHARGE SUMMARY
 
 
PATIENT: ROCIO BLAIR                       UNIT: [id number]
ACCOUNT#: [id number]                       ADM DATE: 19
AGE: 72     : 02/15/47  SEX: M            ROOM/BED: D.0198    
AUTHOR: HUMBERTO,DOC                             PHYSICIAN:                               
 
REFERRING PHYSICIAN: BARBARA RIDLEY MD               
DATE OF SERVICE: 10/03/19
Discharge Plan
 
 
Patient Name: ROCIO BLAIR
Facility: [facility]:[place]
Encounter #: [id number]
Medical Record #: [id number]
: 1947
Planned Disposition: Skilled Nursing Facility
Anticipated Discharge Date: 10/4/19
 
Discharge Date: 
Expected LOS: 8
Initial Reviewer: [email or web address]
Initial Review Date: 10/02/2019
Generated: 10/3/19   6:55 pm 
Comments
 
DCP- Discharge Planning
 
Updated by [id number]: Braulio Kovacs on 10/3/19   4:46 pm CT
Patient Name:  ROCIO BLAIR   
Encounter No:  [id number]   
:  1947   
Primary Insurance:  [employer] CHOICE PPO MCR ADVANT  
Anticipated DC Date: 10-   
Planned Disposition:  Skilled Nursing Facility  
External Planned Provider: [facility], MEDICARE REHAB BED  
  
  
CM RECEIVED CALL FROM BELKYS AT [facility], [phone], NOTIFIED THEY WILL 
ACCEPT TOMORROW MORNING, WILL ARRANGE VAN  EARLY. CM NOTIFIED PT IN 
ROOM AND PT'S SISTER, LUCIE AVITIA, VIA PHONE, WHO IS IN AGREEMENT WITH 
DISCHARGE PLAN.   ELI VERDE NOTIFIED.  IMPORTANT MESSAGE FROM MEDICARE 
PROVIDED AND EXPLAINED TO PT.   
  
FOR DISCHARGE, FAX DISCHARGE INFORMATION TO [facility] [phone].  NURSE 
REPORT TO BE CALLED TO [facility] [phone]. [facility] TO ARRANGE VAN  
FIRST THING IN THE MORNING, 10-04-19.  
  
 
Braulio Kovacs, CASE MANAGEMENT
DCP- Discharge Planning
 
Updated by [id number]: Braulio Kovacs on 10/3/19  11:26 am CT
Patient Name:  ROCIO BLAIR   
Encounter No:  [id number]   
:  1947   
Primary Insurance:  [employer] CHOICE PPO MCR ADVANT  
Anticipated DC Date: 10-   
Planned Disposition:  Skilled Nursing Facility  
External Planned Provider: [facility]  
  
  
DCP follow-up note: CM RETURNED CALL TO NANY AT [facility], [phone], 
[facility] HAS SUBMITTED FOR INSURANCE AUTHORIZATION.    
  
CM WAITING ADMISSION DETERMINATION FROM [facility] IN [place] AS WELL AS 
INSURANCE DETERMINATION FOR SKILLED REHAB SERVICES.  
  
ARMAAN THORNE
DCP- Discharge Planning
 
Updated by [id number]: Braulio Kovacs on 10/2/19   3:02 pm CT
Patient Name:  ROCIO BLAIR   
Encounter No:  [id number]   
:  1947   
Primary Insurance:  [employer] CHOICE PPO MCR ADVANT  
Anticipated DC Date: 10-   
Planned Disposition:  Skilled Nursing Facility  
External Planned Provider:  [facility] HEALTH AND REHAB, MEDICARE REHAB BED  
  
  
DCP follow-up note:   
CM RECEIVED CALL FROM [facility] OF [facility]PATIENT REH[facility] WHO INFORMED CM THAT 
INSURANCE HAS DECLINED PT AND THAT A PEER TO PEER COULD BE DONE.  CM SPOKE TO 
DR. DUARTE WHO AGREED TO DO PEER TO PEER.  CM CALLED INSURANCE COMPANY AND 
ARRANGED FOR INSURANCE TO CALL DR. DUARTE TODAY.  CM MET WITH PT IN ROOM TO 
DISCUSS DISCHARGE PLANNING AND NEEDS. PT WAS UNABLE TO SPEAK AND CM WAS ABLE 
TO READ PT'S LIPS.  PT REPORTS LIVING AT HOME INDEPENDENTLY WITH HIS ADULT 
SISTER.  PT STATES REHAB WHEN ASKED ABOUT DISCHARGE PLAN AND DIRECTED CM TO 
CALL HIS SISTER.  CM CALLED PT'S SISTER, LUCIE AVITIA, [phone].  LUCIE  
REPORTS PT LIVES WITH THEM AND HE NEEDS REHAB AND IF AT NOT AT INPATIENT 
REHAB, THEN AT [facility] IN [place].  CHOICE LETTER COMPLETED.  LUCIE 
REPORTS THAT IF PT GETS TO THE POINT HE CAN CARE FOR HIMSELF, THE GOAL IS TO 
GET HIM INTO A GOVERNMENT SUBSUDIZED APARTMENT.  IF NOT, PT MAY NEED LONG 
TERM CARE.  CM LATER INFORMED THAT PEER TO PEER HAD BEEN DONE AND INSURANCE 
DECLINED INPATIENT REHAB SERVICES.  
  
CM CALLED BELKYS AT [facility], [phone], NOTIFIED OF REFERRAL.  CM FAXED 
REFERRAL FOR REHAB TO [facility] [phone].  CM WAITING ADMISSION 
DETERMINATION FROM [facility] IN [place] AS WELL AS INSURANCE DETERMINATION FOR 
SKILLED REHAB SERVICES.  
 
  
ARMAAN THORNE
 DCPIA - Discharge Planning Initial Assessment
 
Updated by [id number]: Braulio Kovacs on 10/2/19   3:50 pm
*  Is the patient Alert and Oriented?
Yes
*  How many steps to enter\exit or inside your home? NONE *  PCP DR. NIELSEN IN
[place]
*  Pharmacy
[facility] IN [place]
*  Preadmission Environment
Home with Family
*  ADLs
Independent
*  Equipment
Cane
Walker
*  Other Equipment
NO MEDICAL EQUIPMENT PROVIDER PREFERENCE
*  List name and contact numbers for known caregivers / representatives who 
currently or will assist patient after discharge:
LUCIE AVITIA, SISTER, [phone]
*  Verbal permission to speak to the caregivers and representatives has been 
obtained from the patient.
Yes
*  Community resources currently utilized
None
*  Please name any agencies selected above.
NONE
*  Additional services required to return to the preadmission environment?
No
*  Can the patient safely return to the preadmission environment?
Yes
*  Has this patient been hospitalized within the prior 30 days at any 
hospital?
No
 
 
 
 
 
Coverage Notice
 
Reviewer: [id number] Eliecer Kovacs
 
Notice Issued Date-Time: 10/03/2019  17:45
Notice Type: IM Discharge Notice
 
Notice Delivered To: Patient
Relationship to Patient: 
Representative Name: 
 
 
Delivery Method: HAND - Hand Delivered
Maria C Days:
Prior Verbal Notification: 
 
Recipient Understood Notice: Yes
Recipient Signature: Yes
Med Rec Note Co-signed by Attending:
 
Coverage Notice Comment:  
 
Last DP export: 10/3/19   4:46 p
Patient Name: ROCIO BLAIR
Encounter #: [id number]
Page [id number]
 
 
 
 
 
Electronically Signed by DARIANA PAUL on 10/03/19 at 1756
 
 
 
 
 
 
**All edits/amendments must be made on the electronic document**
 
DICTATION DATE: 10/03/19 1755     : FRANK  10/03/19 1755     
RPT#: [id number]                                DC DATE:        
                                               STATUS: ADM IN  
[facility]
[address] [place] [address]
***END OF REPORT***

## 2019-10-03 NOTE — NUR
PATIENT IS RESTING IN BED AND IS WATCHING TV. PATIENT DENIES ANY CONCERNS OR
PAIN AT THIS TIME. BED IS IN THE LOWEST POSITION AND CALL LIGHT IN REACH. WILL
CONTINUE TO MONITOR.

## 2019-10-03 NOTE — NUR
Nutrition Follow-up:
Pt reports overall good/fair appetite/PO intake. Drinking at least 1
Boost/day.
Diet: Regular, Boost with meals
PO intake: %
No new wt
Last BM: 10/2
Labs reviewed
Meds reviewed
-May consider resume Cardiac diet.
-Encouraged increased Boost intake.
-RD following.

## 2019-10-03 NOTE — NUR
PATIENT IS RESTING IN BED AND WATCHING TV. PATIENT DENIES ANY PAIN OR CONCERNS
AT THIS TIME. BED IN THE LOWEST POSITION AND CALL LIGHT WITH IN REACH. WILL
CONTINUE TO MONITOR.

## 2019-10-03 NOTE — NUR
PATIENT RESTING IN BED. NO VISIBLE SIGNS OF DISTRESS. BED IN THE LOWEST
POSITION AND CALL LIGHT IN REACH. WILL CONTINUE TO MONITOR.

## 2019-10-03 NOTE — NUR
PATIENT IS AAO. RESTING WATCHING TV. IV LFA IS PATENT, NO REDNESS OR SWELLING
NOTED. FOLLEY IS IN PLACE AND PATENT, EMPTIED 300ML. PATIENT DENIES ANY PAIN
OR CONCERNS AT THIS TIME.BED IS IN THE LOWEST POSTION AND CALL LIGHT IN REACH.
WILL CONTINUE TO MONITOR.

## 2019-10-03 NOTE — NUR
REPORT RECEIVED. WILL CONTINUE WITH POC. PT CURRENTLY LYING SEMI FOWLERS. PT
IS RESTING AT THIS TIME. CALL LIGHT W/I REACH. RR EVENA ND UNLABORED ON RA. NS
INFUSING @100ML/HR VIA L.WRIST PIV. COFFEY IN PLACE AND DRAINING URINE. PT
DENIES ANY NEEDS AT THIS TIME. NO S/S OF DISTRESS NOTED. WILL CTM.

## 2019-10-03 NOTE — MORECARE
CASE MANAGEMENT DISCHARGE SUMMARY
 
 
PATIENT: ROCIO BLAIR                       UNIT: [id number]
ACCOUNT#: [id number]                       ADM DATE: 19
AGE: 72     : 02/15/47  SEX: M            ROOM/BED: D.5590    
AUTHOR: HUMBERTO,DOC                             PHYSICIAN:                               
 
REFERRING PHYSICIAN: BARBARA RIDLEY MD               
DATE OF SERVICE: 10/03/19
Discharge Plan
 
 
Patient Name: ROCIO BLAIR
Facility: [facility]:[place]
Encounter #: [id number]
Medical Record #: [id number]
: 1947
Planned Disposition: Skilled Nursing Facility
Anticipated Discharge Date: 10/3/19
 
Discharge Date: 
Expected LOS: 7
Initial Reviewer: [email or web address]
Initial Review Date: 10/02/2019
Generated: 10/3/19   1:32 pm 
Comments
 
DCP- Discharge Planning
 
Updated by [id number]: Braulio Jackson on 10/3/19  11:26 am CT
Patient Name:  ROCIO BLAIR   
Encounter No:  [id number]   
:  1947   
Primary Insurance:  [employer] CHOICE PPO MCR ADVANT  
Anticipated DC Date: 10-   
Planned Disposition:  Skilled Nursing Facility  
External Planned Provider: [facility]  
  
  
DCP follow-up note: CM RETURNED CALL TO NANY AT [facility], [phone], 
[facility] HAS SUBMITTED FOR INSURANCE AUTHORIZATION.    
  
CM WAITING ADMISSION DETERMINATION FROM [facility] IN [place] AS WELL AS 
INSURANCE DETERMINATION FOR SKILLED REHAB SERVICES.  
  
BRAULIO JACKSON, CASE MANAGEMENT
DCP- Discharge Planning
 
Updated by [id number]: Braulio Jackson on 10/2/19   3:02 pm CT
 
Patient Name:  ROCIO BLAIR   
Encounter No:  [id number]   
:  1947   
Primary Insurance:  [employer] CHOICE PPO MCR ADVANT  
Anticipated DC Date: 10-   
Planned Disposition:  Skilled Nursing Facility  
External Planned Provider:  [facility], MEDICARE REHAB BED  
  
  
DCP follow-up note:   
CM RECEIVED CALL FROM [facility] OF [facility]PATIENT REH[facility] WHO INFORMED CM THAT 
INSURANCE HAS DECLINED PT AND THAT A PEER TO PEER COULD BE DONE.  CM SPOKE TO 
DR. DUARTE WHO AGREED TO DO PEER TO PEER.  CM CALLED INSURANCE COMPANY AND 
ARRANGED FOR INSURANCE TO CALL DR. DUARTE TODAY.  CM MET WITH PT IN ROOM TO 
DISCUSS DISCHARGE PLANNING AND NEEDS. PT WAS UNABLE TO SPEAK AND CM WAS ABLE 
TO READ PT'S LIPS.  PT REPORTS LIVING AT HOME INDEPENDENTLY WITH HIS ADULT 
SISTER.  PT STATES REHAB WHEN ASKED ABOUT DISCHARGE PLAN AND DIRECTED CM TO 
CALL HIS SISTER.  CM CALLED PT'S SISTER, LUCIE AVITIA, [phone].  LUCIE  
REPORTS PT LIVES WITH THEM AND HE NEEDS REHAB AND IF AT NOT AT INPATIENT 
REHAB, THEN AT [facility] IN [place].  CHOICE LETTER COMPLETED.  LUCIE 
REPORTS THAT IF PT GETS TO THE POINT HE CAN CARE FOR HIMSELF, THE [facility]AL [facility].  IF NOT, PT MAY NEED LONG 
TERM CARE.  CM LATER INFORMED THAT PEER TO PEER HAD BEEN DONE AND INSURANCE 
DECLINED INPATIENT REHAB SERVICES.  
  
CM CALLED BELKYS AT [facility], [phone], NOTIFIED OF REFERRAL.  CM FAXED 
REFERRAL FOR REHAB TO [facility] [phone].  CM WAITING ADMISSION 
DETERMINATION FROM [facility] IN [place] AS WELL AS INSURANCE DETERMINATION FOR 
SKILLED REHAB SERVICES.  
  
BRAULIO JACKSON, CASE MANAGEMENT
 DCPIA - Discharge Planning Initial Assessment
 
Updated by [id number]: Braulio Jackson on 10/2/19   3:50 pm
*  Is the patient Alert and Oriented?
Yes
*  How many steps to enter\exit or inside your home? NONE *  PCP DR. NIELSEN IN
[place]
*  Pharmacy
[facility] IN [place]
*  Preadmission Environment
Home with Family
*  ADLs
Independent
*  Equipment
Cane
Walker
*  Other Equipment
NO MEDICAL EQUIPMENT PROVIDER PREFERENCE
*  List name and contact numbers for known caregivers / representatives who 
currently or will assist patient after discharge:
LUCIE AVITIA, SISTER, [phone]
 
*  Verbal permission to speak to the caregivers and representatives has been 
obtained from the patient.
Yes
*  Community resources currently utilized
None
*  Please name any agencies selected above.
NONE
*  Additional services required to return to the preadmission environment?
No
*  Can the patient safely return to the preadmission environment?
Yes
*  Has this patient been hospitalized within the prior 30 days at any 
hospital?
No
 
 
 
 
 
 
 
Last DP export: 10/2/19   3:07 p
Patient Name: ROCIO BLAIR
Encounter #: [id number]
Page [phone]
 
 
 
 
 
Electronically Signed by DARIANA PAUL on 10/03/19 at 1232
 
 
 
 
 
 
**All edits/amendments must be made on the electronic document**
 
DICTATION DATE: 10/03/19 1232     : FRANK  10/03/19 1232     
RPT#: [id number]                                DC DATE:        
                                               STATUS: ADM IN  
[facility]
[address] [place] [address]
***END OF REPORT***

## 2019-10-04 NOTE — NUR
DISCHARGE INSTRUCTIONS REVIEWED WITH PT AND ALL QUESTIONS ANSWERED. PIV
REMOVED WITH CATHETER TIP INTACT. ASSISTED PT INTO WHEELCHAIR WHERE HE LEFT
WITH [facility] TRANSPORT. REPORT CALLED TO [facility].

## 2019-10-04 NOTE — MORECARE
CASE MANAGEMENT DISCHARGE SUMMARY
 
 
PATIENT: ROCIO BLAIR                       UNIT: [id number]
ACCOUNT#: [id number]                       ADM DATE: 19
AGE: 72     : 02/15/47  SEX: M            ROOM/BED: D.8891    
AUTHOR: HUMBERTO,DOC                             PHYSICIAN:                               
 
REFERRING PHYSICIAN: BARBARA RIDLEY MD               
DATE OF SERVICE: 10/04/19
Discharge Plan
 
 
Patient Name: ROCIO BLAIR
Facility: [facility]
Encounter #: [id number]
Medical Record #: [id number]
: 1947
Planned Disposition: Skilled Nursing Facility
Anticipated Discharge Date: 10/4/19
 
Discharge Date: 
Expected LOS: 8
Initial Reviewer: [email or web address]
Initial Review Date: 10/02/2019
Generated: 10/4/19  10:43 am 
Comments
 
DCP- Discharge Planning
 
Updated by [email or web address]: Braulio Jackson on 10/4/19   8:39 am CT
Patient Name:  ROCIO BLAIR   
Encounter No:  [id number]   
:  1947   
Primary Insurance:  [employer] CHO[employer] PPO MCR ADVANT  
Anticipated DC Date: 10-   
Planned Disposition:  Skilled Nursing Facility  
External Planned Provider: [facility], MEDICARE REHAB BED  
  
  
DCP follow-up note:   
FOR CM RECEIVED DISCHARGE, CM NOTIFIED PT.  CM FAXED DISCHARGE INFORMATION TO 
[facility] [phone].  CM SPOKE TO BELKYS AT [facility], SHE WILL CALL PT'S 
FAMILY TO GET ASSISTANCE WITH FILLING OUT ADMISSION PAPERWORK.  [facility] TO 
SEND VAN AT 1000AM.   NURSE AND PT NOTIFIED.  
  
NURSE REPORT TO BE CALLED TO [facility] [phone]. [facility] TO ARRANGE VAN 
 TODAY AT 1000AM.  
  
Braulio Jackson, CASE MANAGEMENT
 
DCP- Discharge Planning
 
Updated by [email or web address]: Braulio Jackson on 10/3/19   4:46 pm CT
Patient Name:  ROCIO BLAIR   
Encounter No:  [id number]   
:  1947   
Primary Insurance:  [employer] CHOICE PPO MCR ADVANT  
Anticipated DC Date: 10-   
Planned Disposition:  Skilled Nursing Facility  
External Planned Provider: [facility], MEDICARE REHAB BED  
  
  
CM RECEIVED CALL FROM BELKYS AT [facility], [phone], NOTIFIED THEY WILL 
ACCEPT TOMORROW MORNING, WILL ARRANGE VAN  EARLY. CM NOTIFIED PT IN 
ROOM AND PT'S SISTER, LUCIE AVITIA, VIA PHONE, WHO IS IN AGREEMENT WITH 
DISCHARGE PLAN.   ELI VERDE NOTIFIED.  IMPORTANT MESSAGE FROM MEDICARE 
PROVIDED AND EXPLAINED TO PT.   
  
FOR DISCHARGE, FAX DISCHARGE INFORMATION TO [facility] [phone].  NURSE 
REPORT TO BE CALLED TO [facility] [phone]. [facility] TO ARRANGE VAN  
FIRST THING IN THE MORNING, 10-04-19.  
  
Braulio Jackson CASE MANAGEMENT
DCP- Discharge Planning
 
Updated by [email or web address]: Braulio Jackson on 10/3/19  11:26 am CT
Patient Name:  ROCIO BLAIR   
Encounter No:  [id number]   
:  1947   
Primary Insurance:  [employer] CHOICE PPO MCR ADVANT  
Anticipated DC Date: 10-   
Planned Disposition:  Skilled Nursing Facility  
External Planned Provider: [facility]  
  
  
DCP follow-up note: CM RETURNED CALL TO NANY AT [facility], [phone], 
[facility] HAS SUBMITTED FOR INSURANCE AUTHORIZATION.    
  
CM WAITING ADMISSION DETERMINATION FROM [facility] IN [place] AS WELL AS 
INSURANCE DETERMINATION FOR SKILLED REHAB SERVICES.  
  
ARMAAN THORNE
DCP- Discharge Planning
 
Updated by [email or web address]: Braulio Jackson on 10/2/19   3:02 pm CT
Patient Name:  ROCIO BLAIR   
Encounter No:  [id number]   
:  1947   
Primary Insurance:  [employer] CHOICE PPO MCR ADVANT  
Anticipated DC Date: 10-   
Planned Disposition:  Skilled Nursing Facility  
External Planned Provider:  [facility] AND REHAB, MEDICARE REHAB BED  
 
  
  
DCP follow-up note:   
CM RECEIVED CALL FROM [facility] WHO INFORMED CM THAT 
INSURANCE HAS DECLINED PT AND THAT A PEER TO PEER COULD BE DONE.  CM SPOKE TO 
DR. DUARTE WHO AGREED TO DO PEER TO PEER.  CM CALLED INSURANCE COMPANY AND 
ARRANGED FOR INSURANCE TO CALL DR. DUARTE TODAY.  CM MET WITH PT IN ROOM TO 
DISCUSS DISCHARGE PLANNING AND NEEDS. PT WAS UNABLE TO SPEAK AND CM WAS ABLE 
TO READ PT'S LIPS.  PT REPORTS LIVING AT HOME INDEPENDENTLY WITH HIS ADULT 
SISTER.  PT STATES REHAB WHEN ASKED ABOUT DISCHARGE PLAN AND DIRECTED CM TO 
CALL HIS SISTER.  CM CALLED PT'S SISTER, LUCIE AVITIA, [phone].  LUCIE  
REPORTS PT LIVES WITH THEM AND HE NEEDS REHAB AND IF AT NOT AT INPATIENT 
REHAB, THEN AT [facility] IN [place].  CHOICE LETTER COMPLETED.  LUCIE 
REPORTS THAT IF PT GETS TO THE POINT HE CAN CARE FOR HIMSELF, THE GOAL IS TO 
[facility].  IF NOT, PT MAY NEED LONG 
TERM CARE.  CM LATER INFORMED THAT PEER TO PEER HAD BEEN DONE AND INSURANCE 
DECLINED INPATIENT REHAB SERVICES.  
  
CM CALLED BELKYS AT [facility], [phone], NOTIFIED OF REFERRAL.  CM FAXED 
REFERRAL FOR REHAB TO [facility] [phone].  CM WAITING ADMISSION 
DETERMINATION FROM [facility] IN [place] AS WELL AS INSURANCE DETERMINATION FOR 
SKILLED REHAB SERVICES.  
  
BRAULIO JACKSON, CASE MANAGEMENT
 DCPIA - Discharge Planning Initial Assessment
 
Updated by [email or web address]: Braulio Jackson on 10/2/19   3:50 pm
*  Is the patient Alert and Oriented?
Yes
*  How many steps to enter\exit or inside your home? NONE *  PCP DR. NIELSEN IN
[place]
*  Pharmacy
[facility] IN [place]
*  Preadmission Environment
Home with Family
*  ADLs
Independent
*  Equipment
Cane
Walker
*  Other Equipment
NO MEDICAL EQUIPMENT PROVIDER PREFERENCE
*  List name and contact numbers for known caregivers / representatives who 
currently or will assist patient after discharge:
LUCIE AVITIA, SISTER, [phone]
*  Verbal permission to speak to the caregivers and representatives has been 
obtained from the patient.
Yes
*  Community resources currently utilized
None
*  Please name any agencies selected above.
NONE
 
*  Additional services required to return to the preadmission environment?
No
*  Can the patient safely return to the preadmission environment?
Yes
*  Has this patient been hospitalized within the prior 30 days at any 
hospital?
No
 
 
 
 
 
Coverage Notice
 
Reviewer: [id number] - Braulio Jackson
 
Notice Issued Date-Time: 10/03/2019  17:45
Notice Type: IM Discharge Notice
 
Notice Delivered To: Patient
Relationship to Patient: 
Representative Name: 
 
Delivery Method: HAND - Hand Delivered
Maria C Days:
Prior Verbal Notification: 
 
Recipient Understood Notice: Yes
Recipient Signature: Yes
Med Rec Note Co-signed by Attending:
 
Coverage Notice Comment:  
 
Last DP export: 10/3/19   4:56 p
Patient Name: ROCIO BLAIR
 
Encounter #: [id number]
Page [id number]
 
 
 
 
 
Electronically Signed by DARIANA PAUL on 10/04/19 at 0943
 
 
 
 
 
 
**All edits/amendments must be made on the electronic document**
 
DICTATION DATE: 10/04/19 0943     : DM  10/04/19 0943     
RPT#: [id number]                                DC DATE:        
                                               STATUS: ADM IN  
[facility]
 [place] [address]
***END OF REPORT***

## 2023-08-02 NOTE — NUR
PATIENT IS RESTING IN BED. REPOSITIONED PATIENT IN BED AND ELEVATED THE HEAD
OF THE BED TO 45 DEGREES. BREATHING IS EVEN AND UNLABORED. PATIENT DENIES ANY
PAIN OR CONCERNS AT THIS TIME. PATIENTS IV IS PATENT RUNNING NS @ 100.
COFFEY IS IN PLACE AND PATENT. BED IN LOWEST POSITION AND CALL LIGHT IN REACH. Detail Level: Detailed Add 1585x Cpt? (Do Not Bill If You Billed For The Procedure Placing The Sutures. This Is An Add-On Code That Must Be Billed With An E/M Visit Code): No